# Patient Record
Sex: MALE | Race: BLACK OR AFRICAN AMERICAN | Employment: FULL TIME | URBAN - METROPOLITAN AREA
[De-identification: names, ages, dates, MRNs, and addresses within clinical notes are randomized per-mention and may not be internally consistent; named-entity substitution may affect disease eponyms.]

---

## 2021-01-11 PROCEDURE — 99284 EMERGENCY DEPT VISIT MOD MDM: CPT

## 2021-01-11 PROCEDURE — 26725 TREAT FINGER FRACTURE EACH: CPT

## 2021-01-12 ENCOUNTER — APPOINTMENT (OUTPATIENT)
Dept: GENERAL RADIOLOGY | Age: 36
End: 2021-01-12
Payer: COMMERCIAL

## 2021-01-12 ENCOUNTER — HOSPITAL ENCOUNTER (EMERGENCY)
Age: 36
Discharge: HOME | End: 2021-01-12
Attending: EMERGENCY MEDICINE
Payer: COMMERCIAL

## 2021-01-12 VITALS
OXYGEN SATURATION: 98 % | DIASTOLIC BLOOD PRESSURE: 100 MMHG | HEART RATE: 92 BPM | TEMPERATURE: 98 F | RESPIRATION RATE: 18 BRPM | SYSTOLIC BLOOD PRESSURE: 162 MMHG

## 2021-01-12 DIAGNOSIS — S62.616A DISPLACED FRACTURE OF PROXIMAL PHALANX OF RIGHT LITTLE FINGER, INITIAL ENCOUNTER FOR CLOSED FRACTURE: Primary | ICD-10-CM

## 2021-01-12 PROCEDURE — 6370000000 HC RX 637 (ALT 250 FOR IP): Performed by: STUDENT IN AN ORGANIZED HEALTH CARE EDUCATION/TRAINING PROGRAM

## 2021-01-12 PROCEDURE — 73130 X-RAY EXAM OF HAND: CPT

## 2021-01-12 PROCEDURE — 6360000002 HC RX W HCPCS: Performed by: STUDENT IN AN ORGANIZED HEALTH CARE EDUCATION/TRAINING PROGRAM

## 2021-01-12 PROCEDURE — 90715 TDAP VACCINE 7 YRS/> IM: CPT | Performed by: STUDENT IN AN ORGANIZED HEALTH CARE EDUCATION/TRAINING PROGRAM

## 2021-01-12 PROCEDURE — 90471 IMMUNIZATION ADMIN: CPT | Performed by: STUDENT IN AN ORGANIZED HEALTH CARE EDUCATION/TRAINING PROGRAM

## 2021-01-12 PROCEDURE — 73120 X-RAY EXAM OF HAND: CPT

## 2021-01-12 RX ORDER — ACETAMINOPHEN 325 MG/1
650 TABLET ORAL ONCE
Status: COMPLETED | OUTPATIENT
Start: 2021-01-12 | End: 2021-01-12

## 2021-01-12 RX ORDER — IBUPROFEN 400 MG/1
400 TABLET ORAL ONCE
Status: COMPLETED | OUTPATIENT
Start: 2021-01-12 | End: 2021-01-12

## 2021-01-12 RX ORDER — ACETAMINOPHEN 325 MG/1
325 TABLET ORAL EVERY 6 HOURS PRN
Qty: 60 TABLET | Refills: 0 | Status: SHIPPED | OUTPATIENT
Start: 2021-01-12 | End: 2021-03-02 | Stop reason: ALTCHOICE

## 2021-01-12 RX ORDER — IBUPROFEN 400 MG/1
400 TABLET ORAL EVERY 6 HOURS PRN
Qty: 60 TABLET | Refills: 0 | Status: SHIPPED | OUTPATIENT
Start: 2021-01-12 | End: 2021-03-02 | Stop reason: ALTCHOICE

## 2021-01-12 RX ADMIN — IBUPROFEN 400 MG: 400 TABLET, FILM COATED ORAL at 01:41

## 2021-01-12 RX ADMIN — ACETAMINOPHEN 650 MG: 325 TABLET ORAL at 01:41

## 2021-01-12 RX ADMIN — TETANUS TOXOID, REDUCED DIPHTHERIA TOXOID AND ACELLULAR PERTUSSIS VACCINE, ADSORBED 0.5 ML: 5; 2.5; 8; 8; 2.5 SUSPENSION INTRAMUSCULAR at 01:41

## 2021-01-12 ASSESSMENT — PAIN DESCRIPTION - PROGRESSION: CLINICAL_PROGRESSION: GRADUALLY WORSENING

## 2021-01-12 ASSESSMENT — ENCOUNTER SYMPTOMS
NAUSEA: 0
RHINORRHEA: 0
ABDOMINAL PAIN: 0
VOMITING: 0
SHORTNESS OF BREATH: 0
SORE THROAT: 0

## 2021-01-12 ASSESSMENT — PAIN DESCRIPTION - ONSET: ONSET: SUDDEN

## 2021-01-12 ASSESSMENT — PAIN DESCRIPTION - FREQUENCY: FREQUENCY: CONTINUOUS

## 2021-01-12 ASSESSMENT — PAIN SCALES - GENERAL: PAINLEVEL_OUTOF10: 7

## 2021-01-12 NOTE — ED TRIAGE NOTES
Pt to ED with c/o rt sided pinky finger pain. Pt states on Friday he was working on something and his hand slid off and hit his pinky. Pt states he thought it was dislocated and he attempted to put it back in place but the pain wont go away. Pt has no other complaints at this time.

## 2021-01-12 NOTE — ED PROVIDER NOTES
[facility]  ED  Emergency Department Encounter  EmergencyMedicine Resident     Pt Name:Ramirez Cabrera  MRN: [id number]  [place] 1985  Date of evaluation: 1/12/21  PCP:  No primary care provider on file. CHIEF COMPLAINT       Chief Complaint   Patient presents with    Finger Pain       HISTORY OF PRESENT ILLNESS  (Location/Symptom, Timing/Onset, Context/Setting, Quality, Duration, Modifying Factors, Severity.)      Jackie Anthony is a 37 y.o. male who presents with right pinky finger pain. Patient presents 4 days after accidentally hitting his fifth proximal phalanx with a hammer. Patient initially reports medial deformity, straightened it himself, splinted it, has been taking Tylenol for pain. Patient reports he is finally able to come to the emergency department for evaluation. Patient denies any other injuries, no other complaints. Patient does note that there is a slight abrasion on the dorsal aspect of the right fifth digit. Patient does not know when his last tetanus shot was. Patient denies past medical history, denies taking any medications. PAST MEDICAL / SURGICAL / SOCIAL / FAMILY HISTORY      has no past medical history on file. Fifth metacarpal fracture     has no past surgical history on file.       Social History     Socioeconomic History    Marital status:      Spouse name: Not on file    Number of children: Not on file    Years of education: Not on file    Highest education level: Not on file   Occupational History    Not on file   Social Needs    Financial resource strain: Not on file    Food insecurity     Worry: Not on file     Inability: Not on file    Transportation needs     Medical: Not on file     Non-medical: Not on file   Tobacco Use    Smoking status: Never Smoker   Substance and Sexual Activity    Alcohol use: Not Currently    Drug use: Never    Sexual activity: Yes     Partners: Female   Lifestyle    Physical activity He is well-developed. He is not ill-appearing, toxic-appearing or diaphoretic. HENT:      Head: Normocephalic and atraumatic. Neck:      Musculoskeletal: Normal range of motion and neck supple. Vascular: No JVD. Trachea: No tracheal deviation. Cardiovascular:      Rate and Rhythm: Normal rate and regular rhythm. Pulses: Normal pulses. Heart sounds: Normal heart sounds. No murmur. No friction rub. No gallop. Pulmonary:      Effort: Pulmonary effort is normal. No respiratory distress. Breath sounds: Normal breath sounds. No stridor. No wheezing, rhonchi or rales. Chest:      Chest wall: No tenderness. Musculoskeletal: Normal range of motion. General: Swelling and tenderness present. No deformity. Comments: Of the right pinky finger, patient has swelling of the proximal aspect of the phalanx with dorsal abrasion, sensation to light touch in all aspects, capillary refill less than 2 seconds, tenderness over the proximal aspect of the phalanx, no other tenderness of any other digit or of the hand, no other injuries noted. Skin:     General: Skin is warm. Capillary Refill: Capillary refill takes less than 2 seconds. Neurological:      Mental Status: He is alert and oriented to person, place, and time. Cranial Nerves: No cranial nerve deficit. Sensory: No sensory deficit.    Psychiatric:         Mood and Affect: Mood normal.         Behavior: Behavior normal.         DIFFERENTIAL  DIAGNOSIS     PLAN (LABS / IMAGING / EKG):  Orders Placed This Encounter   Procedures    XR HAND RIGHT (MIN 3 VIEWS)    XR HAND RIGHT (MIN 3 VIEWS)    XR HAND RIGHT (2 VIEWS)       MEDICATIONS ORDERED:  Orders Placed This Encounter   Medications    Tetanus-Diphth-Acell Pertussis (BOOSTRIX) injection 0.5 mL    ibuprofen (ADVIL;MOTRIN) tablet 400 mg    acetaminophen (TYLENOL) tablet 650 mg    acetaminophen (TYLENOL) 325 MG tablet     Sig: Take 1 tablet by mouth every 6 hours as needed for Pain     Dispense:  60 tablet     Refill:  0    ibuprofen (IBU) 400 MG tablet     Sig: Take 1 tablet by mouth every 6 hours as needed for Pain     Dispense:  60 tablet     Refill:  0       DDX: Fracture, dislocation, soft tissue injury    DIAGNOSTIC RESULTS / EMERGENCY DEPARTMENT COURSE / MDM   LAB RESULTS:  No results found for this visit on 01/12/21. IMPRESSION: 79-year-old male with initial angulation to a proximal fifth phalanx injury, presenting 4 days after the injury, concern for fracture versus dislocation. Will obtain x-rays, slight abrasion over dorsal aspect of phalanx, will administer Tdap, will treat symptoms with Tylenol and ibuprofen. RADIOLOGY:  Xr Hand Right (2 Views)    Result Date: 1/12/2021  EXAMINATION: TWO XRAY VIEWS OF THE RIGHT HAND 1/12/2021 2:39 am COMPARISON: None. HISTORY: ORDERING SYSTEM PROVIDED HISTORY: post reduction TECHNOLOGIST PROVIDED HISTORY: post reduction Reason for Exam: post reduction #2 FINDINGS: Anatomic approximation of fracture fragments of the 5th digit proximal phalanx distal diaphyseal fracture noted with splint in place. Bone alignment is within normal limits. Joint spaces are preserved. Bone mineralization is unremarkable. Diffuse 5th digit soft tissue swelling is noted. Anatomic approximation of fracture fragments associated with 5th digit proximal phalanx distal diaphyseal transverse acute fracture. Xr Hand Right (min 3 Views)    Result Date: 1/12/2021  EXAMINATION: THREE XRAY VIEWS OF THE RIGHT HAND 1/12/2021 2:00 am COMPARISON: None. HISTORY: ORDERING SYSTEM PROVIDED HISTORY: post reduction TECHNOLOGIST PROVIDED HISTORY: post reduction Reason for Exam: Post reduction 5th digit FINDINGS: Interval splinting of the 5th digit is noted with approximation of fracture fragments of the 5th digit proximal phalanx distal diaphyseal transverse acute fracture. No further evidence of acute fracture or evidence of joint dislocation is seen. Bone mineralization is within normal limits. Bone alignment is otherwise unremarkable. Diffuse 5th digit soft tissue swelling is evident. Interval splinting and reduction of 5th digit proximal phalanx distal diaphyseal transverse acute fracture, as discussed above. Xr Hand Right (min 3 Views)    Result Date: 1/12/2021  EXAMINATION: THREE XRAY VIEWS OF THE RIGHT HAND 1/12/2021 1:13 am COMPARISON: None. HISTORY: ORDERING SYSTEM PROVIDED HISTORY: 5th proximal phalanx injury TECHNOLOGIST PROVIDED HISTORY: 5th proximal phalanx injury Reason for Exam: Smashed hand with hammer FINDINGS: Soft tissue swelling identified along 5th digit and 5th metacarpal.  There is a fracture involving the proximal phalanx of the 5th digit with 45 degrees anterior angulation. No additional fracture identified. Joint spaces are preserved. Proximal 5th digit phalangeal fracture. EKG      All EKG's are interpreted by the Emergency Department Physician who either signs or Co-signs this chart in the absence of a cardiologist.    EMERGENCY DEPARTMENT COURSE:  Patient came to emergency department, HPI and physical exam were conducted. All nursing notes were reviewed. Administered digital nerve block, reduced angulated fracture with good approximation however still angulated, did second reduction with good approximation of the fractured fragments. Patient remained stable in the emergency department, slightly hypertensive, otherwise stable vital signs. Gave strict return precautions to the emergency department and discharge patient home. Recommended patient follow-up with plastic surgery in the next 2 days for reassessment.       PROCEDURES:  PROCEDURE NOTE - DIGITAL NERVE BLOCK    PATIENT NAME: [facility] RECORD NO. [id number]  DATE: 1/12/2021  ATTENDING PHYSICIAN: Dr. Brent Calle DIAGNOSIS:  Digit Pain  POSTOPERATIVE DIAGNOSIS:  Same  PROCEDURE PERFORMED:  Location: right short (pinkie) finger nerve block   PERFORMING PHYSICIAN: Cam Vyas MD      DISCUSSION:  J Carlos Warner is a 37y.o.-year-old male who requires a Location: right short (pinkie) finger digital nerve block for pain relief. The history and physical examination were reviewed and confirmed. CONSENT: The patient provided verbal consent for this procedure. PROCEDURE:The patient was placed with the dorsum of the hand up. 2.5 cc of 1% Lidocaine without epinephrine was injected in the web space on each side of the Location: right short (pinkie) finger digit. The patient tolerated the procedure well. Kelly George MD  6:15 AM, 1/12/21      PROCEDURE NOTE - FRACTURE REDUCTION    PATIENT NAME: [facility] RECORD NO. [id number]  DATE: 1/12/2021  ATTENDING PHYSICIAN: Dr. Canseco DIAGNOSIS:  right short (pinkie) finger proximal phalanx angulated fracture  POSTOPERATIVE DIAGNOSIS:  Same  PROCEDURE PERFORMED:  Reduction of right small finger proximal phalanx fracture reduction  PERFORMING PHYSICIAN: Cam Vyas MD    DISCUSSION:  J Carlos Warner is a 37y.o.-year-old male who requires reduction of the right small finger proximal phalanx fracture. The history and physical examination were reviewed and confirmed. CONSENT: The patient provided verbal consent for this procedure. PROCEDURE:  The pre-reduction exam showed distal perfusion & neurologic function to be normal. The patient was placed in the appropriate position. Anesthesia/pain control was obtained using a digital block of the right short (pinkie) finger using 1% Lidocaine without epinephrine. Reduction was performed. Post reduction films were obtained and revealed partial but satisfactory reduction. A post-reduction exam revealed distal perfusion & neurologic function to be normal. The affected area was immobilized with an aluminum/ foam splint. The patient tolerated the procedure well.     COMPLICATIONS: None     Evelin Lewis MD  6:15 AM, 1/12/21        CONSULTS:  None    CRITICAL CARE:  Please see attending note    FINAL IMPRESSION      1.  Displaced fracture of proximal phalanx of right little finger, initial encounter for closed fracture          DISPOSITION / PLAN     DISPOSITION Decision To Discharge 01/12/2021 02:35:59 AM      PATIENT REFERRED TO:  [facility] ED  [address]  [phone]  Go to   As needed, If symptoms worsen    A Cody Lugo MD  [address], [address]  [phone]    Schedule an appointment as soon as possible for a visit in 2 days  For reassessment      DISCHARGE MEDICATIONS:  Discharge Medication List as of 1/12/2021  2:36 AM      START taking these medications    Details   acetaminophen (TYLENOL) 325 MG tablet Take 1 tablet by mouth every 6 hours as needed for Pain, Disp-60 tablet, R-0Print      ibuprofen (IBU) 400 MG tablet Take 1 tablet by mouth every 6 hours as needed for Pain, Disp-60 tablet, R-0Print             Evelin Lewis MD  Emergency Medicine Resident    (Please note that portions of thisnote were completed with a voice recognition program.  Efforts were made to edit the dictations but occasionally words are mis-transcribed.)        Evelin Lewis MD  Resident  01/12/21 6800

## 2021-01-12 NOTE — ED PROVIDER NOTES
[facility] ED  Emergency Department  Faculty Attestation       I performed a history and physical examination of the patient and discussed management with the resident. I reviewed the residents note and agree with the documented findings including all diagnostic interpretations and plan of care. Any areas of disagreement are noted on the chart. I was personally present for the key portions of any procedures. I have documented in the chart those procedures where I was not present during the key portions. I have reviewed the emergency nurses triage note. I agree with the chief complaint, past medical history, past surgical history, allergies, medications, social and family history as documented unless otherwise noted below. Documentation of the HPI, Physical Exam and Medical Decision Making performed by sameeribcarmelo is based on my personal performance of the HPI, PE and MDM. For Physician Assistant/ Nurse Practitioner cases/documentation I have personally evaluated this patient and have completed at least one if not all key elements of the E/M (history, physical exam, and MDM). Additional findings are as noted. Pertinent Comments     Primary Care Physician: No primary care provider on file. ED Triage Vitals [01/12/21 0001]   BP Temp Temp Source Pulse Resp SpO2 Height Weight   (!) 162/100 98 °F (36.7 °C) Tympanic 92 18 98 % -- --        History/Physical: This is a 37 y.o. male who presents to the Emergency Department with complaint of right pinky finger injury on Friday (today is Monday). He went to hammer something and missed and hit is hand on something. He states it was angulated and he reduced it himself and had splinted it himself and now wanted to come in and get evaluated. On exam patient has tenderness over the right proximal phalanx with an abrasion and swelling over the area. Has limited ROM of PIP jt. Normal ROM of MCP and DID. Cap refill intact. Sensation intact. MDM/Plan: Right finger injruy  Proximal phalanx fracture with angulation. Digital nerve block   Reduction and splinting perfomred, suboptimal position. Repeated reduction attempted  F/u hand surgery   Return if any concerns arise.        CRITICAL CARE: None     Haim Boucher MD  Attending Emergency Physician        Haim Boucher MD  01/12/21 [id number]

## 2021-01-19 ENCOUNTER — OFFICE VISIT (OUTPATIENT)
Dept: BURN CARE | Age: 36
End: 2021-01-19
Payer: COMMERCIAL

## 2021-01-19 ENCOUNTER — TELEPHONE (OUTPATIENT)
Dept: BURN CARE | Age: 36
End: 2021-01-19

## 2021-01-19 VITALS
WEIGHT: 285 LBS | HEIGHT: 72 IN | DIASTOLIC BLOOD PRESSURE: 99 MMHG | SYSTOLIC BLOOD PRESSURE: 151 MMHG | TEMPERATURE: 97.3 F | HEART RATE: 79 BPM | BODY MASS INDEX: 38.6 KG/M2

## 2021-01-19 DIAGNOSIS — S62.616A CLOSED DISPLACED FRACTURE OF PROXIMAL PHALANX OF RIGHT LITTLE FINGER, INITIAL ENCOUNTER: ICD-10-CM

## 2021-01-19 DIAGNOSIS — Z00.00 ROUTINE PHYSICAL EXAMINATION: Primary | ICD-10-CM

## 2021-01-19 PROCEDURE — 99203 OFFICE O/P NEW LOW 30 MIN: CPT | Performed by: PLASTIC SURGERY

## 2021-01-19 PROCEDURE — 99202 OFFICE O/P NEW SF 15 MIN: CPT

## 2021-01-19 RX ORDER — CYCLOBENZAPRINE HCL 10 MG
TABLET ORAL
COMMUNITY
Start: 2020-12-29 | End: 2021-03-02 | Stop reason: ALTCHOICE

## 2021-01-19 NOTE — PROGRESS NOTES
Relationship status: Not on file    Intimate partner violence     Fear of current or ex partner: Not on file     Emotionally abused: Not on file     Physically abused: Not on file     Forced sexual activity: Not on file   Other Topics Concern    Not on file   Social History Narrative    Not on file     History reviewed. No pertinent family history. Review of systems is otherwise negative. BP (!) 151/99   Pulse 79   Temp 97.3 °F (36.3 °C) (Oral)   Ht 6' (1.829 m)   Wt 285 lb (129.3 kg)   BMI 38.65 kg/m²       Objective:   Physical Exam  Vitals signs and nursing note reviewed. Constitutional:       General: He is not in acute distress. Appearance: He is well-developed. He is not diaphoretic. HENT:      Head: Normocephalic and atraumatic. Nose: Nose normal.      Mouth/Throat:      Mouth: Mucous membranes are moist.      Pharynx: Oropharynx is clear. Eyes:      General:         Right eye: No discharge. Left eye: No discharge. Conjunctiva/sclera: Conjunctivae normal.      Pupils: Pupils are equal, round, and reactive to light. Neck:      Musculoskeletal: Normal range of motion. Thyroid: No thyromegaly. Vascular: No JVD. Trachea: No tracheal deviation. Cardiovascular:      Rate and Rhythm: Normal rate. Pulmonary:      Effort: Pulmonary effort is normal. No respiratory distress. Breath sounds: No wheezing. Abdominal:      General: There is no distension. Palpations: Abdomen is soft. Musculoskeletal:         General: Swelling, tenderness, deformity and signs of injury present. Comments: Displaced fracture right little finger proximal phalanx distal aspect   Skin:     General: Skin is warm and dry. Neurological:      Mental Status: He is alert and oriented to person, place, and time. Cranial Nerves: No cranial nerve deficit. Psychiatric:         Behavior: Behavior normal.         Thought Content:  Thought content normal. Judgment: Judgment normal.         Assessment:      Displaced right little finger fracture, proximal phalanx distal aspect. Plan:      CRPP versus ORIF right little finger proximal phalanx fracture    The risk of infection, bleeding, scar formation, stiffness, decreased range of motion, mal-union, non-union, need for therapy, and need for re-operation was discusses with the patient/family at great length. The above is understood and they wish to proceed. The patient was evaluated and examined with my nurse in the room at all times. Portions of this note were transcribed using Dragon voice recognition technology and as such may reflect some variations in voice recognition. COVID-19 precautions were taken throughout the entire office visit. Patient was screened for COVID-19 symptoms and temperature was taken prior to coming back to the exam room. A mask as well as gloves was worn throughout the entire office visit and distancing maintained as much as possible in between the physical examination periods.     Abner Lopez MD

## 2021-01-19 NOTE — PATIENT INSTRUCTIONS
Patient encouraged to leave splint on-will need surgery to right hand-patient is non smoker -patient informed of importance of keeping his covid appointment -not workers comp

## 2021-01-19 NOTE — LETTER
[facility], AN AFFILIATE OF [facility]  [address] [place] [id number]  Phone: [phone]  Fax: [phone]    Shelley Silveira MD        January 19, 2021    Sanjay Valencia  [address] [facility] [address]      Dear Matthew Echevarria: We have signed you up for DXY. To access DXY, you can download an amanda in your [employer]ore on an android or apple phone. Search for my chart, it will be a red folder with a heart. It will walk you through on how to finish registration. You can also go to the web site: [email or web address]. Enclosed is your username and password card. DXY is used for patients to view test results, send e-mails to provider, request refills on medications, E-Visits,and a complete summary of your health. If you have any questions or concerns, please don't hesitate to call.     Sincerely,        Shelley Silveira MD

## 2021-01-21 NOTE — PROGRESS NOTES
Writer called to update medical history. No answer. Writer left message that we will attempt contact again on Friday Jan 22.

## 2021-01-22 ENCOUNTER — HOSPITAL ENCOUNTER (OUTPATIENT)
Dept: LAB | Age: 36
Setting detail: SPECIMEN
Discharge: HOME | End: 2021-01-22
Payer: COMMERCIAL

## 2021-01-22 DIAGNOSIS — Z20.822 COVID-19 RULED OUT BY LABORATORY TESTING: Primary | ICD-10-CM

## 2021-01-22 PROCEDURE — U0003 INFECTIOUS AGENT DETECTION BY NUCLEIC ACID (DNA OR RNA); SEVERE ACUTE RESPIRATORY SYNDROME CORONAVIRUS 2 (SARS-COV-2) (CORONAVIRUS DISEASE [COVID-19]), AMPLIFIED PROBE TECHNIQUE, MAKING USE OF HIGH THROUGHPUT TECHNOLOGIES AS DESCRIBED BY CMS-2020-01-R: HCPCS

## 2021-01-22 PROCEDURE — U0005 INFEC AGEN DETEC AMPLI PROBE: HCPCS

## 2021-01-23 LAB
SARS-COV-2, RAPID: NORMAL
SARS-COV-2: NORMAL
SARS-COV-2: NOT DETECTED
SOURCE: NORMAL

## 2021-01-26 ENCOUNTER — ANESTHESIA (OUTPATIENT)
Dept: OPERATING ROOM | Age: 36
End: 2021-01-26
Payer: COMMERCIAL

## 2021-01-26 ENCOUNTER — ANESTHESIA EVENT (OUTPATIENT)
Dept: OPERATING ROOM | Age: 36
End: 2021-01-26
Payer: COMMERCIAL

## 2021-01-26 ENCOUNTER — HOSPITAL ENCOUNTER (OUTPATIENT)
Dept: GENERAL RADIOLOGY | Age: 36
Discharge: HOME | End: 2021-01-28
Payer: COMMERCIAL

## 2021-01-26 ENCOUNTER — HOSPITAL ENCOUNTER (OUTPATIENT)
Age: 36
Setting detail: OUTPATIENT SURGERY
Discharge: HOME | End: 2021-01-26
Attending: PLASTIC SURGERY | Admitting: PLASTIC SURGERY
Payer: COMMERCIAL

## 2021-01-26 VITALS
WEIGHT: 285 LBS | HEIGHT: 72 IN | DIASTOLIC BLOOD PRESSURE: 91 MMHG | HEART RATE: 81 BPM | SYSTOLIC BLOOD PRESSURE: 162 MMHG | RESPIRATION RATE: 19 BRPM | TEMPERATURE: 96.8 F | BODY MASS INDEX: 38.6 KG/M2 | OXYGEN SATURATION: 94 %

## 2021-01-26 VITALS — TEMPERATURE: 95 F | OXYGEN SATURATION: 99 % | DIASTOLIC BLOOD PRESSURE: 55 MMHG | SYSTOLIC BLOOD PRESSURE: 115 MMHG

## 2021-01-26 DIAGNOSIS — G89.18 POST-OPERATIVE PAIN: Primary | ICD-10-CM

## 2021-01-26 DIAGNOSIS — R52 PAIN: ICD-10-CM

## 2021-01-26 PROCEDURE — 3700000001 HC ADD 15 MINUTES (ANESTHESIA): Performed by: PLASTIC SURGERY

## 2021-01-26 PROCEDURE — 7100000010 HC PHASE II RECOVERY - FIRST 15 MIN: Performed by: PLASTIC SURGERY

## 2021-01-26 PROCEDURE — 6360000002 HC RX W HCPCS: Performed by: NURSE ANESTHETIST, CERTIFIED REGISTERED

## 2021-01-26 PROCEDURE — 7100000000 HC PACU RECOVERY - FIRST 15 MIN: Performed by: PLASTIC SURGERY

## 2021-01-26 PROCEDURE — 6370000000 HC RX 637 (ALT 250 FOR IP): Performed by: ANESTHESIOLOGY

## 2021-01-26 PROCEDURE — 2500000003 HC RX 250 WO HCPCS: Performed by: NURSE ANESTHETIST, CERTIFIED REGISTERED

## 2021-01-26 PROCEDURE — 3600000004 HC SURGERY LEVEL 4 BASE: Performed by: PLASTIC SURGERY

## 2021-01-26 PROCEDURE — 3209999900 FLUORO FOR SURGICAL PROCEDURES

## 2021-01-26 PROCEDURE — 2580000003 HC RX 258: Performed by: NURSE ANESTHETIST, CERTIFIED REGISTERED

## 2021-01-26 PROCEDURE — 7100000011 HC PHASE II RECOVERY - ADDTL 15 MIN: Performed by: PLASTIC SURGERY

## 2021-01-26 PROCEDURE — 2580000003 HC RX 258: Performed by: PLASTIC SURGERY

## 2021-01-26 PROCEDURE — 6360000002 HC RX W HCPCS: Performed by: STUDENT IN AN ORGANIZED HEALTH CARE EDUCATION/TRAINING PROGRAM

## 2021-01-26 PROCEDURE — L3933 FO W/O JOINTS CF: HCPCS | Performed by: PLASTIC SURGERY

## 2021-01-26 PROCEDURE — 3600000014 HC SURGERY LEVEL 4 ADDTL 15MIN: Performed by: PLASTIC SURGERY

## 2021-01-26 PROCEDURE — 6360000002 HC RX W HCPCS: Performed by: ANESTHESIOLOGY

## 2021-01-26 PROCEDURE — 2709999900 HC NON-CHARGEABLE SUPPLY: Performed by: PLASTIC SURGERY

## 2021-01-26 PROCEDURE — 3700000000 HC ANESTHESIA ATTENDED CARE: Performed by: PLASTIC SURGERY

## 2021-01-26 PROCEDURE — 7100000001 HC PACU RECOVERY - ADDTL 15 MIN: Performed by: PLASTIC SURGERY

## 2021-01-26 RX ORDER — KETOROLAC TROMETHAMINE 30 MG/ML
INJECTION, SOLUTION INTRAMUSCULAR; INTRAVENOUS PRN
Status: DISCONTINUED | OUTPATIENT
Start: 2021-01-26 | End: 2021-01-26 | Stop reason: SDUPTHER

## 2021-01-26 RX ORDER — OXYCODONE HYDROCHLORIDE AND ACETAMINOPHEN 5; 325 MG/1; MG/1
1 TABLET ORAL EVERY 6 HOURS PRN
Qty: 28 TABLET | Refills: 0 | Status: SHIPPED | OUTPATIENT
Start: 2021-01-26 | End: 2021-02-02

## 2021-01-26 RX ORDER — ONDANSETRON 2 MG/ML
INJECTION INTRAMUSCULAR; INTRAVENOUS PRN
Status: DISCONTINUED | OUTPATIENT
Start: 2021-01-26 | End: 2021-01-26 | Stop reason: SDUPTHER

## 2021-01-26 RX ORDER — FENTANYL CITRATE 50 UG/ML
25 INJECTION, SOLUTION INTRAMUSCULAR; INTRAVENOUS EVERY 5 MIN PRN
Status: DISCONTINUED | OUTPATIENT
Start: 2021-01-26 | End: 2021-01-26 | Stop reason: HOSPADM

## 2021-01-26 RX ORDER — FENTANYL CITRATE 50 UG/ML
50 INJECTION, SOLUTION INTRAMUSCULAR; INTRAVENOUS EVERY 5 MIN PRN
Status: DISCONTINUED | OUTPATIENT
Start: 2021-01-26 | End: 2021-01-26 | Stop reason: HOSPADM

## 2021-01-26 RX ORDER — SODIUM CHLORIDE, SODIUM LACTATE, POTASSIUM CHLORIDE, CALCIUM CHLORIDE 600; 310; 30; 20 MG/100ML; MG/100ML; MG/100ML; MG/100ML
INJECTION, SOLUTION INTRAVENOUS CONTINUOUS PRN
Status: DISCONTINUED | OUTPATIENT
Start: 2021-01-26 | End: 2021-01-26 | Stop reason: SDUPTHER

## 2021-01-26 RX ORDER — ROCURONIUM BROMIDE 10 MG/ML
INJECTION, SOLUTION INTRAVENOUS PRN
Status: DISCONTINUED | OUTPATIENT
Start: 2021-01-26 | End: 2021-01-26 | Stop reason: SDUPTHER

## 2021-01-26 RX ORDER — FENTANYL CITRATE 50 UG/ML
INJECTION, SOLUTION INTRAMUSCULAR; INTRAVENOUS PRN
Status: DISCONTINUED | OUTPATIENT
Start: 2021-01-26 | End: 2021-01-26 | Stop reason: SDUPTHER

## 2021-01-26 RX ORDER — GLYCOPYRROLATE 1 MG/5 ML
SYRINGE (ML) INTRAVENOUS PRN
Status: DISCONTINUED | OUTPATIENT
Start: 2021-01-26 | End: 2021-01-26 | Stop reason: SDUPTHER

## 2021-01-26 RX ORDER — DEXAMETHASONE SODIUM PHOSPHATE 4 MG/ML
INJECTION, SOLUTION INTRA-ARTICULAR; INTRALESIONAL; INTRAMUSCULAR; INTRAVENOUS; SOFT TISSUE PRN
Status: DISCONTINUED | OUTPATIENT
Start: 2021-01-26 | End: 2021-01-26 | Stop reason: SDUPTHER

## 2021-01-26 RX ORDER — MAGNESIUM HYDROXIDE 1200 MG/15ML
LIQUID ORAL CONTINUOUS PRN
Status: COMPLETED | OUTPATIENT
Start: 2021-01-26 | End: 2021-01-26

## 2021-01-26 RX ORDER — PROPOFOL 10 MG/ML
INJECTION, EMULSION INTRAVENOUS PRN
Status: DISCONTINUED | OUTPATIENT
Start: 2021-01-26 | End: 2021-01-26 | Stop reason: SDUPTHER

## 2021-01-26 RX ORDER — OXYCODONE HYDROCHLORIDE AND ACETAMINOPHEN 5; 325 MG/1; MG/1
1 TABLET ORAL
Status: COMPLETED | OUTPATIENT
Start: 2021-01-26 | End: 2021-01-26

## 2021-01-26 RX ORDER — CEPHALEXIN 500 MG/1
500 CAPSULE ORAL 3 TIMES DAILY
Qty: 21 CAPSULE | Refills: 0 | Status: SHIPPED | OUTPATIENT
Start: 2021-01-26 | End: 2021-02-02

## 2021-01-26 RX ORDER — NEOSTIGMINE METHYLSULFATE 5 MG/5 ML
SYRINGE (ML) INTRAVENOUS PRN
Status: DISCONTINUED | OUTPATIENT
Start: 2021-01-26 | End: 2021-01-26 | Stop reason: SDUPTHER

## 2021-01-26 RX ORDER — LIDOCAINE HYDROCHLORIDE 10 MG/ML
INJECTION, SOLUTION EPIDURAL; INFILTRATION; INTRACAUDAL; PERINEURAL PRN
Status: DISCONTINUED | OUTPATIENT
Start: 2021-01-26 | End: 2021-01-26 | Stop reason: SDUPTHER

## 2021-01-26 RX ADMIN — Medication 3 G: at 13:57

## 2021-01-26 RX ADMIN — FENTANYL CITRATE 25 MCG: 50 INJECTION, SOLUTION INTRAMUSCULAR; INTRAVENOUS at 15:26

## 2021-01-26 RX ADMIN — LIDOCAINE HYDROCHLORIDE 50 MG: 10 INJECTION, SOLUTION EPIDURAL; INFILTRATION; INTRACAUDAL; PERINEURAL at 13:48

## 2021-01-26 RX ADMIN — FENTANYL CITRATE 100 MCG: 50 INJECTION, SOLUTION INTRAMUSCULAR; INTRAVENOUS at 13:48

## 2021-01-26 RX ADMIN — KETOROLAC TROMETHAMINE 30 MG: 30 INJECTION, SOLUTION INTRAMUSCULAR at 14:40

## 2021-01-26 RX ADMIN — PROPOFOL 250 MG: 10 INJECTION, EMULSION INTRAVENOUS at 13:48

## 2021-01-26 RX ADMIN — Medication 0.4 MG: at 14:40

## 2021-01-26 RX ADMIN — SODIUM CHLORIDE, POTASSIUM CHLORIDE, SODIUM LACTATE AND CALCIUM CHLORIDE: 600; 310; 30; 20 INJECTION, SOLUTION INTRAVENOUS at 13:42

## 2021-01-26 RX ADMIN — Medication 3 MG: at 14:40

## 2021-01-26 RX ADMIN — OXYCODONE HYDROCHLORIDE AND ACETAMINOPHEN 1 TABLET: 5; 325 TABLET ORAL at 15:58

## 2021-01-26 RX ADMIN — DEXAMETHASONE SODIUM PHOSPHATE 4 MG: 4 INJECTION, SOLUTION INTRAMUSCULAR; INTRAVENOUS at 13:48

## 2021-01-26 RX ADMIN — SODIUM CHLORIDE, POTASSIUM CHLORIDE, SODIUM LACTATE AND CALCIUM CHLORIDE: 600; 310; 30; 20 INJECTION, SOLUTION INTRAVENOUS at 14:29

## 2021-01-26 RX ADMIN — ROCURONIUM BROMIDE 40 MG: 10 INJECTION INTRAVENOUS at 13:58

## 2021-01-26 RX ADMIN — ONDANSETRON 4 MG: 2 INJECTION INTRAMUSCULAR; INTRAVENOUS at 14:40

## 2021-01-26 ASSESSMENT — PULMONARY FUNCTION TESTS
PIF_VALUE: 8
PIF_VALUE: 4
PIF_VALUE: 3
PIF_VALUE: 19
PIF_VALUE: 22
PIF_VALUE: 32
PIF_VALUE: 0
PIF_VALUE: 25
PIF_VALUE: 25
PIF_VALUE: 37
PIF_VALUE: 25
PIF_VALUE: 21
PIF_VALUE: 25
PIF_VALUE: 35
PIF_VALUE: 25
PIF_VALUE: 25
PIF_VALUE: 1
PIF_VALUE: 4
PIF_VALUE: 19
PIF_VALUE: 25
PIF_VALUE: 27
PIF_VALUE: 25
PIF_VALUE: 27
PIF_VALUE: 25
PIF_VALUE: 25
PIF_VALUE: 33
PIF_VALUE: 25

## 2021-01-26 ASSESSMENT — PAIN SCALES - GENERAL
PAINLEVEL_OUTOF10: 0
PAINLEVEL_OUTOF10: 4

## 2021-01-26 ASSESSMENT — PAIN DESCRIPTION - PAIN TYPE
TYPE: SURGICAL PAIN
TYPE: SURGICAL PAIN

## 2021-01-26 ASSESSMENT — ENCOUNTER SYMPTOMS: SHORTNESS OF BREATH: 0

## 2021-01-26 NOTE — OP NOTE
adaptic and gauze cling was applied followed by an Alumafoam splint and Ace wrap. The procedure was well tolerated without complications. Pt will call with any questions or problems prior to follow up.       Electronically signed by Karely Correa DO on 1/26/2021 at 4:26 PM

## 2021-01-26 NOTE — H&P
History and Physical    Pt Name: Jh Palomo  MRN: [id number]  YOB: 1985  Date of evaluation: 1/26/2021  Primary Care Physician: No primary care provider on file. SUBJECTIVE:   History of Chief Complaint:    Jh Palomo is a 37 y.o. male who is scheduled today for CLOSED REDUCTION PERC. PINNING VS ORIF LITTLE FINGER- RIGHT. He reports fracturing his right little finger while working. He works for the [employer] and was hammering when he hurt his right pinky a couple of weeks ago. He is right hand dominant. He denies pain to site at this time. Allergies  has No Known Allergies. Medications  Prior to Admission medications    Medication Sig Start Date End Date Taking? Authorizing Provider   cyclobenzaprine (FLEXERIL) 10 MG tablet take 1 tablet by mouth every 8 hours 12/29/20  Yes Historical Provider, MD   acetaminophen (TYLENOL) 325 MG tablet Take 1 tablet by mouth every 6 hours as needed for Pain 1/12/21  Yes Liyah Johnson MD   ibuprofen (IBU) 400 MG tablet Take 1 tablet by mouth every 6 hours as needed for Pain 1/12/21  Yes Liyah Johnson MD     Past Medical History    has a past medical history of Fracture of phalanx of right little finger and Wears glasses. Past Surgical History   has a past surgical history that includes fracture surgery (Right); Vasectomy; and Omega tooth extraction. Social History   reports that he has never smoked. He has never used smokeless tobacco.   reports current alcohol use. reports no history of drug use. Marital Status    Children   Occ[employer]  Family History  Family Status   Relation Name Status    Mother  [facility]  (Not Specified)   [address] [place] [address]  (Not Specified)     family history includes Asthma in his mother; Diabetes in his maternal aunt; Hypertension in his maternal grandmother. OBJECTIVE:   VITALS:  height is 6' (1.829 m) and weight is 285 lb (129.3 kg). His temporal temperature is 97.7 °F (36.5 °C).  His blood pressure is 146/102 (abnormal) and his pulse is 70. His respiration is 18 and oxygen saturation is 96%. CONSTITUTIONAL:Alert and orientated to person, place and time. No acute distress. Friendly. Very pleasant. SKIN:  Warm & dry, no rashes on exposed skin  HEENT: HEAD: Normocephalic, atraumatic        EYES:  PERRL, EOMs intact, conjunctiva clear, wearing glasses       EARS:  Equal bilaterally, no edema or thickening, skin is intact without lumps or lesions. No discharge. NOSE:  Nares patent, septum midline, no rhinorrhea      MOUTH/THROAT:  Mucous membranes moist, tongue is pink, uvula midline, teeth appear to be intact  NECK:  Supple, no lymphadenopathy, full ROM  LUNGS: Respirations even and non-labored. Clear to auscultation bilaterally, no wheezes/rales/rhonchi   CARDIOVASCULAR: regular rate and rhythm, no murmurs/rubs/gallops   ABDOMEN: soft, non-tender, non-distended, bowel sounds active x 4, rotund   MUSCULOSKELETAL: Full ROM bilateral upper extremities, right pinky in an alumafoam splint and ace wrap. Full ROM bilateral lower extremities. Strength of 5/5 bilateral upper extremities. Strength 5/5 bilateral lower extremities. VASCULAR:  Brisk cap refill bilateral fingers. Radial pulses are intact, 2+ bilaterally. Dorsalis pedis pulse 2+ bilaterally. No edema or varicosities bilateral lower extremities  NEUROLOGIC: CN II-XII are grossly intact. Gait not assessed. IMPRESSIONS:   Fracture right little finger      Diagnosis Date    Fracture of phalanx of right little finger 01/18/2021    work injury    Wears glasses      PLANS:   CLOSED REDUCTION PERC.  PINNING VS ORIF LITTLE FINGER- Right     RUSTY BENTLEY  Electronically signed 1/26/2021 at 12:38 PM

## 2021-01-26 NOTE — ANESTHESIA POSTPROCEDURE EVALUATION
Department of Anesthesiology  Postprocedure Note    Patient: Paris Samson  MRN: [id number]  YOB: 1985  Date of evaluation: 1/26/2021  Time:  5:48 PM     Procedure Summary     Date: 01/26/21 Room / Location: [address]    Anesthesia Start: 4137 Anesthesia Stop: 2622    Procedure: CLOSED REDUCTION PERC. PINNING LITTLE FINGER, C-ARM, K-WIRES (Right Hand) Diagnosis: (FRACTURE RIGHT LITTLE FINGER)    Surgeons: Stacey Rey MD Responsible Provider: Bree Johnson MD    Anesthesia Type: general ASA Status: 2          Anesthesia Type: general    Jaiden Phase I: Jaiden Score: 10    Jaiden Phase II: Jaiden Score: 10    Last vitals: Reviewed and per EMR flowsheets.        Anesthesia Post Evaluation    Patient location during evaluation: PACU  Patient participation: complete - patient participated  Level of consciousness: awake and alert  Pain score: 0  Nausea & Vomiting: no nausea  Cardiovascular status: hemodynamically stable  Respiratory status: room air  Hydration status: euvolemic 37.3

## 2021-01-26 NOTE — PROGRESS NOTES
Dc instructions to dad with 2 scripts to be filled -voices understanding/pt given instructions with some [id number]

## 2021-01-26 NOTE — ANESTHESIA PRE PROCEDURE
Department of Anesthesiology  Preprocedure Note       Name:  Jo Ann Peralta   Age:  37 y.o.  :  1985                                          MRN:  [id number]         Date:  2021      Surgeon: Villa Mcfarland): Lucy Huggins MD    Procedure: Procedure(s):  CLOSED REDUCTION PERC. PINNING VS ORIF LITTLE FINGER, C-ARM, K-WIRES, MINI     Medications prior to admission:   Prior to Admission medications    Medication Sig Start Date End Date Taking? Authorizing Provider   cyclobenzaprine (FLEXERIL) 10 MG tablet take 1 tablet by mouth every 8 hours 20  Yes Historical Provider, MD   acetaminophen (TYLENOL) 325 MG tablet Take 1 tablet by mouth every 6 hours as needed for Pain 21  Yes Verona Ramirez MD   ibuprofen (IBU) 400 MG tablet Take 1 tablet by mouth every 6 hours as needed for Pain 21  Yes Verona Ramirez MD       Current medications:    No current facility-administered medications for this encounter. Allergies:  No Known Allergies    Problem List:    Patient Active Problem List   Diagnosis Code    Closed displaced fracture of proximal phalanx of right little finger S62.616A       Past Medical History:  History reviewed. No pertinent past medical history. Past Surgical History:        Procedure Laterality Date    HAND SURGERY Right     VASECTOMY      WISDOM TOOTH EXTRACTION         Social History:    Social History     Tobacco Use    Smoking status: Never Smoker    Smokeless tobacco: Never Used   Substance Use Topics    Alcohol use: Not Currently                                Counseling given: Not Answered      Vital Signs (Current): There were no vitals filed for this visit.                                            BP Readings from Last 3 Encounters:   21 (!) 151/99   21 (!) 162/100       NPO Status: Time of last liquid consumption: 2300                        Time of last solid consumption: 2300 Date of last liquid consumption: 01/25/21                        Date of last solid food consumption: 01/25/21    BMI:   Wt Readings from Last 3 Encounters:   01/19/21 285 lb (129.3 kg)     There is no height or weight on file to calculate BMI.    CBC: No results found for: WBC, RBC, HGB, HCT, MCV, RDW, PLT    CMP: No results found for: NA, K, CL, CO2, BUN, CREATININE, GFRAA, AGRATIO, LABGLOM, GLUCOSE, PROT, CALCIUM, BILITOT, ALKPHOS, AST, ALT    POC Tests: No results for input(s): POCGLU, POCNA, POCK, POCCL, POCBUN, POCHEMO, POCHCT in the last 72 hours. Coags: No results found for: PROTIME, INR, APTT    HCG (If Applicable): No results found for: PREGTESTUR, PREGSERUM, HCG, HCGQUANT     ABGs: No results found for: PHART, PO2ART, NZP0NCZ, MUI4UZQ, BEART, Z3LAHDHU     Type & Screen (If Applicable):  No results found for: LABABO, LABRH    Drug/Infectious Status (If Applicable):  No results found for: HIV, HEPCAB    COVID-19 Screening (If Applicable):   Lab Results   Component Value Date    COVID19 Not Detected 01/23/2021         Anesthesia Evaluation  Patient summary reviewed and Nursing notes reviewed no history of anesthetic complications:   Airway: Mallampati: III  TM distance: >3 FB   Neck ROM: full  Mouth opening: > = 3 FB Dental: normal exam         Pulmonary:normal exam  breath sounds clear to auscultation      (-) COPD, asthma, shortness of breath, recent URI and sleep apnea                           Cardiovascular:Negative CV ROS  Exercise tolerance: good (>4 METS),       (-) hypertension, past MI, CAD, CABG/stent,  angina,  CHF, orthopnea and  CRUZ      Rhythm: regular  Rate: normal                    Neuro/Psych:   Negative Neuro/Psych ROS     (-) seizures, TIA and CVA           GI/Hepatic/Renal: Neg GI/Hepatic/Renal ROS       (-) GERD       Endo/Other:        (-) diabetes mellitus                ROS comment: Fx R little finger Abdominal:           Vascular: negative vascular ROS. Anesthesia Plan      general     ASA 2     (GA ET)  Induction: intravenous. MIPS: Postoperative opioids intended and Prophylactic antiemetics administered. Anesthetic plan and risks discussed with patient.       Plan discussed with CRNA and surgical team.                Elly Reagan MD   1/26/2021

## 2021-02-09 ENCOUNTER — OFFICE VISIT (OUTPATIENT)
Dept: BURN CARE | Age: 36
End: 2021-02-09
Payer: COMMERCIAL

## 2021-02-09 VITALS
HEIGHT: 72 IN | WEIGHT: 285 LBS | DIASTOLIC BLOOD PRESSURE: 87 MMHG | HEART RATE: 78 BPM | SYSTOLIC BLOOD PRESSURE: 133 MMHG | BODY MASS INDEX: 38.6 KG/M2

## 2021-02-09 DIAGNOSIS — S62.616D CLOSED DISPLACED FRACTURE OF PROXIMAL PHALANX OF RIGHT LITTLE FINGER WITH ROUTINE HEALING, SUBSEQUENT ENCOUNTER: ICD-10-CM

## 2021-02-09 PROCEDURE — 99211 OFF/OP EST MAY X REQ PHY/QHP: CPT | Performed by: PLASTIC SURGERY

## 2021-02-09 PROCEDURE — 99024 POSTOP FOLLOW-UP VISIT: CPT | Performed by: PLASTIC SURGERY

## 2021-02-09 NOTE — PROGRESS NOTES
Burn/Hand Clinic Note    S: Pt is a 37 y.o. male being seen for 2 week post op visit for CRPP right 5th proximal phalanx. Surgery was on 1/26. Patient is doing well today and has no complaints. He has maintained his splint. Reports that he has not had any pain since the swelling has gone down. Denies any fevers or chills. 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 O:   Vitals:    02/09/21 1013   BP: 133/87   Pulse: 78     Gen: NAD, cooperative    MSK: Right upper extremity: Alumafoam splint on, which is clean, dry, and intact. No surrounding erythema or edema. No evidence of infection. Hand warm and perfused. Non-splinted digits with full range of motion. A/P: 37 y.o. male being seen for post op visit of CRPP right 5th proximal phalanx fracture  -Maintain splint. Keep clean and dry.   -No lifting, pushing, or pulling of the right upper extremity.   -Ibuprofen/tylenol for pain control  -F/u 3 weeks for pin removal     Chelsy Rosa DO   10:26 AM 2/9/2021    Attending Physician Statement  I have discussed the case, including pertinent history and exam findings with the resident. I have seen and examined the patient and the key elements of all parts of the encounter have been performed by me. I agree with the assessment, plan and orders as documented by the resident.   Doe Chisholm MD

## 2021-03-02 ENCOUNTER — OFFICE VISIT (OUTPATIENT)
Dept: BURN CARE | Age: 36
End: 2021-03-02
Payer: COMMERCIAL

## 2021-03-02 VITALS
TEMPERATURE: 97.9 F | BODY MASS INDEX: 39.33 KG/M2 | SYSTOLIC BLOOD PRESSURE: 143 MMHG | HEART RATE: 90 BPM | WEIGHT: 290 LBS | DIASTOLIC BLOOD PRESSURE: 85 MMHG

## 2021-03-02 DIAGNOSIS — S62.616D CLOSED DISPLACED FRACTURE OF PROXIMAL PHALANX OF RIGHT LITTLE FINGER WITH ROUTINE HEALING, SUBSEQUENT ENCOUNTER: Primary | ICD-10-CM

## 2021-03-02 PROCEDURE — 99024 POSTOP FOLLOW-UP VISIT: CPT | Performed by: PLASTIC SURGERY

## 2021-03-02 NOTE — PROGRESS NOTES
[facility] Office Note  ARNOLDO Li MD, FACS      PATIENT NAME: Davi Valencia     Pt comes in today following close reduction percutaneous pinning of his right little finger fracture. Patient has no complaints at this time. REVIEW OF SYSTEMS:    Past Medical History:   Diagnosis Date    Fracture of phalanx of right little finger 2021    work injury    Wears glasses      Past Surgical History:   Procedure Laterality Date    FINGER SURGERY Right     CRPP    FINGER SURGERY Right 2021    CLOSED REDUCTION PERC. PINNING LITTLE FINGER, C-ARM, K-WIRES performed by Magalie Reddy MD at [address] [place] [address] Right     hand, age 34   Saul VASECTOMY      WISDOM TOOTH EXTRACTION       No Known Allergies  No current outpatient medications on file. No current facility-administered medications for this visit.       BP (!) 143/85   Pulse 90   Temp 97.9 °F (36.6 °C) (Oral)   Wt 290 lb (131.5 kg)   BMI 39.33 kg/m²   Social History     Socioeconomic History    Marital status:      Spouse name: Not on file    Number of children: Not on file    Years of education: Not on file    Highest education level: Not on file   Occupational History    Not on file   Social Needs    Financial resource strain: Not on file    Food insecurity     Worry: Not on file     Inability: Not on file    Transportation needs     Medical: Not on file     Non-medical: Not on file   Tobacco Use    Smoking status: Former Smoker     Quit date: 2004     Years since quittin.1    Smokeless tobacco: Never Used    Tobacco comment: used to smoke for couple months as a teen   Substance and Sexual Activity    Alcohol use: Yes     Comment: social    Drug use: Never    Sexual activity: Yes     Partners: Female   Lifestyle    Physical activity     Days per week: Not on file     Minutes per session: Not on file    Stress: Not on file   Relationships    Social connections Talks on phone: Not on file     Gets together: Not on file     Attends Mu-ism service: Not on file     Active member of club or organization: Not on file     Attends meetings of clubs or organizations: Not on file     Relationship status: Not on file    Intimate partner violence     Fear of current or ex partner: Not on file     Emotionally abused: Not on file     Physically abused: Not on file     Forced sexual activity: Not on file   Other Topics Concern    Not on file   Social History Narrative    Not on file       Review of systems is otherwise negative. On examination pin site is clean and intact. The skin overlying the pinswas cleansed with an alcohol wipe, incised and pushed aside exposing the underlying pin. Each Pin was firmly grasped with a pair of needle holders and rotated while pulled in a retrograde fashion until it was completely removed. 2 K wires were removed. The Patient tolerated the procedure well and a band aid was placed. Patient Active Problem List   Diagnosis    Closed displaced fracture of proximal phalanx of right little finger         Plan:  1. Start occupational therapy. 2.  Patient can return to work with no use of the right hand. 3.  Follow-up in 4 weeks. COVID-19 precautions were taken throughout the entire office visit. Patient was screened for COVID-19 symptoms and temperature was taken prior to coming back to the exam room. A mask as well as gloves was worn throughout the entire office visit and distancing maintained as much as possible in between the physical examination periods. The patient was seen, evaluated, and treated with my nurse in the room at all times. Portions of this note were transcribed using Dragon voice recognition technology and may reflect some voice recognition variability.

## 2021-03-08 ENCOUNTER — HOSPITAL ENCOUNTER (OUTPATIENT)
Dept: OCCUPATIONAL THERAPY | Age: 36
Setting detail: THERAPIES SERIES
Discharge: HOME | End: 2021-03-08
Payer: COMMERCIAL

## 2021-03-08 PROCEDURE — 97165 OT EVAL LOW COMPLEX 30 MIN: CPT

## 2021-03-08 PROCEDURE — 97140 MANUAL THERAPY 1/> REGIONS: CPT

## 2021-03-08 PROCEDURE — 97110 THERAPEUTIC EXERCISES: CPT

## 2021-03-08 NOTE — CONSULTS
[x] [address] [facility] floor       [address], [place]         Phone: [phone]       Fax: [phone] [] [facility]  [address] [place] [address]  Phone: [phone]  Fax: [phone]       Occupational Therapy Hand & Upper Extremity  Initial Evaluation    Date: 3/8/2021      Patient: Nnia Huynh  : 1985  MRN: [id number]  Referring Provider:  Simba Diallo MD  Insurance: Other BCBS 180/180 life time amount   Medical Diagnosis: displaced fracture of proximal phalanx of R little finger, subsequent encounter for fracture with routine healing S62.616D   Rehab Codes: pain in hand M79.646,, stiffness in hand M25.64,, lack of coordination R27.8,, fine motor skills loss R29.818,, muscle weakness generalized M62.81, or adherent scar L90.5,  Onset Date: 21    Next Dr. Milla Tolbert: 3-30-21    Subjective:   CC: It is tight, I cannot move the finger even with the brace off. HPI: (onset date) 21 was working on a train car and hit the  causing a fx in the small digit    Mechanism of Injury: hit hand on train part  Surgery Date: 21  Precautions: Other \"do not use R arm\"    Involved Extremity: Right   Dominant Extremity: Right    Past Medical History: Unremarkable          Comorbidities: NA    Medications: None Allergies:  None    Pain: Intensity:   6/10 Location: Small MCP     Pain Type: constant  Pain Altered Tx: no  Action Taken:none            Home Environment:    Pt lives in a  and House With 4+ and B Handrail CARY  The washing machine is on and the lower level (basement)    [employer]ocation [employer]   Job Status []Normal duty []Light duty [x] Off due to condition []Retired  []Not employed []Disability  []Other:  []  Return to work:    Work activities/duties      Avocational Activities Children     [x] [address]     [] Grandparenting     [] Care giver [] OTHER [] NA       ADL/IADL Previous level of function Current level of function Who assists or modifications made or needed   Bathing  [x] Independent    [] Assist  [] Independent    [x] Assist  Wife helps with all assist   Dress/grooming [x] Independent    [] Assist  [] Independent    [x] Assist     Transfer/mobility [x] Independent    [] Assist  [x] Independent    [] Assist     Feeding [x] Independent    [] Assist  [] Independent    [x] Assist     Toileting [x] Independent    [] Assist  [x] Independent    [] Assist     Driving [x] Independent    [] Assist  [x] Independent    [] Assist     Housekeeping [x] Independent    [] Assist  [] Independent    [x] Assist     Grocery shop/meal prep [x] Independent    [] Assist  [] Independent    [x] Assist       Device: Independent   Orthosis: Currently has  Digit extension  Objective: Tests/Measurements: Upper Extremity Functional Index  Current Functional Level:  39 functionally impaired as measured with the Upper Extremity Functional Index Survey. 0-80 scale, with 80 = no Deficits  (The UEFI model does not provide any specific cut off points that could classify the upper limb disability degree, however, a minimal detectable change of 9 points is provided. This means that for improvement or deterioration to be considered, between two subsequent evaluations, the scores must differ by at least 9 points.)    Sensibility: Normal Edema: Min      Skin Color: Normal Skin/Scar condition Dry and Scabs    Problems: Pain, ROM, Strength, Function, Edema, Adherent Scar and Coordination     STRENGTH (Measured in pounds) 3-8-21     pounds RIGHT LEFT    45 75   Lateral pinch 20 23   2 point pinch 13 14   3 jaw pinch 18 20   The affected extremity is 40% weaker than the unaffected extremity.   (affected score/unaffected score, take the total and subtract from 100)    DIGITS   Right Extension/Flexion 3-8-21  degrees LITTLE   MCP 0/18   PIP 0/18   DIP 0/10         R Small Digit  L small Digit  P1  7.5   P1 6.5  P2 6.2   P2 5.8    Assessment:  Patient would benefit from skilled occupational therapy services in order to: Improve  functional /grasp, Motor control/Tone in UE, ROM, Strength, Activity tolerance, Coordination deficit and Complaint of pain in order to Ensure safe return to work    Short Term Goals: (  12    Treatments)  1. Decrease Pain: to 4/10 with simple grooming tasks at home  2. Increase AROM (degrees) (extension/flexion)  a. R small digit MCP flexion to 30 for increased promotion for loose fist  b. R small digit PIP flexion to 30 for increased promotion for loose fist  c. R small DIP flexion to 25 for increased promotion of loose fist  3. Increase strength (pounds);  a. R  strength to 55 for increased I with home care tasks (open jars)  b. R Lateral pinch to 22 for increased I with heavy home tasks  c. R 2 point pinch to 15 for increased I with heavy home tasks  d. R 3 jaw pinch to 24 for increased I with heavy home tasks  4. Increase function:UE Functional Index Score 54 or more points to promote increased functional abilities  5. Decrease Edema: of R small digit at P1 to 7.0 and P2 to 5.8   6. Patient to be independent with home exercise program as demonstrated by performance with correct form without cues. Long Term Goals: (  24  Treatments)  1. Scar will be soft and pliable with minimal tethering. 7. 2. Increase AROM (degrees) (extension/flexion)  a. R small digit MCP flexion to 75 for increased promotion for loose fist  b. R small digit PIP flexion to 65 for increased promotion for loose fist  c. R small DIP flexion to 50 for increased promotion of loose fist  8.  Increase strength (pounds);  a. R  strength to 65 for increased I with home care tasks (open jars)  b. R Lateral pinch to 25 for increased I with heavy home tasks  c. R 2 point pinch to 17 for increased I with heavy home tasks   R 3 jaw pinch to 24 for increased I with heavy home tasks    Patient Goals: be able to open jars/doors without pain or serious effort, be able to write with ease and decreased pain    Treatment Potential: Good Suggested Professional Referral: No  Domestic Concerns: No   Barriers to Goal Achievement: No    Comments/Assessment: Pt presents s/p injury to R small digit while using a hammer to put a coupling together. Pt had CRPP on 1-26-21, pins now removed. Pt wearing a metal store bought digit extension splint this date. Noted swelling and pin site with adhered scars. Pt has minimal movement of the AROM R small digit with flexion and minimal PROM of the R small digit flexion. Issued AROM exercises for home this date. Home Program Initiated: Written, Verbal and Demo Comprehension of Education: Yes       Plans, Goals, Risks, Benefits, Discussed with and Patient    Treatment Plan:  Therapeutic Ex 58422, Therapeutic Act 51761, HEP, Cold/Hot Pack, Ultrasound [id number] and ADL 13197    Treatment Plan:  Frequency: 3 x/weeks for  24 visits     Today's Treatment:  Modalities:  Precautions: No use of R UE at work per doc   Exercise Flow Sheet:  Exercise Reps/Time Weight/Level Comments   AROM 10  Added and completed (See chart)   PROM   Added and completed                           Other: Pt reminded to use small digit with AROM exercises as demo guarding with movement. Specific Instructions for Next Treatment: continue to add in light strengthening exercises. Evaluation Complexity:  History (Personal factors, comorbidities) [x]  0 []  1-2 []  3+   Exam (limitations, restrictions) [x]  1-2 []  3 []  4+   Decision Making [x]  Low []  Moderate []  High   ?  [x]  Low Complexity []  Moderate   Complexity []  High Complexity      Treatment Charges: Mins Units Time In/Out   Evaluation  []  High  []  Moderate  [x]  Low  25  1    [x]  Ther Exercise 13 1    [x]  Manual Therapy 11 1    []  Ther Activities      []  Orthotic fit/train      []  Orthotic recheck      []        Total Treatment time 49 min Time In: 4796-4109    Electronically signed by TRISTEN Zarco on 3/8/2021 at 11:07 AM    Physician Signature: _________________________ Date: _______________  By signing above or cosigning this note, I have reviewed this plan of care and certify a need for medically necessary rehabilitation services.      *PLEASE SIGN ABOVE AND FAX BACK ALL PAGES*

## 2021-03-10 ENCOUNTER — HOSPITAL ENCOUNTER (OUTPATIENT)
Dept: OCCUPATIONAL THERAPY | Age: 36
Setting detail: THERAPIES SERIES
Discharge: HOME | End: 2021-03-10
Payer: COMMERCIAL

## 2021-03-10 PROCEDURE — 97035 APP MDLTY 1+ULTRASOUND EA 15: CPT

## 2021-03-10 PROCEDURE — 97140 MANUAL THERAPY 1/> REGIONS: CPT

## 2021-03-10 PROCEDURE — 97110 THERAPEUTIC EXERCISES: CPT

## 2021-03-10 NOTE — FLOWSHEET NOTE
[x] [facility]       Occupational Therapy            1st floor       [address], [place]         Phone: [phone]       Fax: [phone] [] [facility]  [address] [place] [address]  Phone: [phone]  Fax: [phone]      Occupational Therapy Daily Treatment Note    Date:  3/10/2021  Patient Name:  Gilberto Chinchilla    :  1985  MRN: [id number]  Patient: Gilberto Chinchilla             : 1985                      MRN: [id number]  Referring Provider:  Mery Melo MD  Insurance: Other BCBS 180/180 life time amount   Medical Diagnosis: displaced fracture of proximal phalanx of R little finger, subsequent encounter for fracture with routine healing S62.616D            Rehab Codes: pain in hand M79.646,, stiffness in hand M25.64,, lack of coordination R27.8,, fine motor skills loss R29.818,, muscle weakness generalized M62.81, or adherent scar L90.5,  Onset Date: 21                Next Dr. Palacios Sero: 3-30-21  Visit# / total visits: ; Progress note for Medicare patient due at visit 9-10   Cancels/No Shows: 0/0      Subjective:    Pain:  [x] Yes  [] No Location: dorsal side of R small digit MCP Pain Rating: (0-10 scale) 4/10  Pain altered Tx:  [x] No  [] Yes  Action:  Pt Comments:\"it is not as painful as it was before\"    Objective:  Modalities:Modality Flow Sheet:  START STOP Tx Modality     Electrical Stim:     3-10-21  Ultrasound: _.8__ W/cm2 x __8_ mins  Duty factor: _x_100%  __50%  __33% __20%  Head size:    __2_ cm  MHz: __1mHz  _x_3mHz  Location: dorsal side of L      Hot Pack:     Cold Pack:     Precautions:  Exercises:  EXERCISE    REPS/     TIME  WEIGHT/    LEVEL COMMENTS   AROM 10  completed   PROM MCP, PIP, DIP 30 mins  completed   Towel Crunches 10  Added and issued   Active Blocked Digits 10  Added and issued                     Other: Pt tolerated PROM this date with noted crepitus and popping of the joint.  Pt Issued Towel crunches and Active Blocked Digits for home use. Educated on importance of the Active Blocked Digits with good return. Treatment Charges: Mins Units   []  Modalities:      [x]  Ultrasound 8 1   [x]  Ther Exercise 15 1   [x]  Manual Therapy 30 2   []  Ther Activities     []  Orthotic fit/train     []  Orthotic recheck     []  Other     Total Treatment time 53          Assessment: [x] Progressing toward goals. [] No change. [] Other     [x] Patient would benefit from skilled occupational therapy services in order to: Improve  functional /grasp, Motor control/Tone in UE, ROM, Strength, Activity tolerance, Coordination deficit and Complaint of pain in order to Ensure safe return to work     Short Term Goals: (  12    Treatments)  1. Decrease Pain: to 4/10 with simple grooming tasks at home  2. Increase AROM (degrees) (extension/flexion)  a. R small digit MCP flexion to 30 for increased promotion for loose fist  b. R small digit PIP flexion to 30 for increased promotion for loose fist  c. R small DIP flexion to 25 for increased promotion of loose fist  3. Increase strength (pounds);  a. R  strength to 55 for increased I with home care tasks (open jars)  b. R Lateral pinch to 22 for increased I with heavy home tasks  c. R 2 point pinch to 15 for increased I with heavy home tasks  d. R 3 jaw pinch to 24 for increased I with heavy home tasks  4. Increase function:UE Functional Index Score 54 or more points to promote increased functional abilities  5. Decrease Edema: of R small digit at P1 to 7.0 and P2 to 5.8   6. Patient to be independent with home exercise program as demonstrated by performance with correct form without cues.     Long Term Goals: (  24  Treatments)  1. Scar will be soft and pliable with minimal tethering. 7. 2. Increase AROM (degrees) (extension/flexion)  a.  R small digit MCP flexion to 75 for increased promotion for loose fist  b. R small digit PIP flexion to 65 for increased promotion for loose fist  c. R small DIP flexion to 50 for increased promotion of loose fist  8. Increase strength (pounds);  a. R  strength to 65 for increased I with home care tasks (open jars)  b. R Lateral pinch to 25 for increased I with heavy home tasks  c. R 2 point pinch to 17 for increased I with heavy home tasks              R 3 jaw pinch to 24 for increased I with heavy home tasks    Pt. Education:  [x] Yes  [] No  [x] Reviewed Prior HEP/Ed  Method of Education: [x] Verbal  [] Demo  [] Written  Re:  Comprehension of Education:  [] Verbalizes understanding. [x] Demonstrates understanding. [] Needs review. [x] Demonstrates/verbalizes HEP/Ed previously given. Plan: [x] Continue current frequency toward short and long term goals.   [x] Specific Instructions for subsequent treatments: plan to add in putty next visit for promotion of ROM and Strength   [] Other:       Time In: 9354-0224      Electronically signed by:  KARINA Bradford/TISHA

## 2021-03-12 ENCOUNTER — HOSPITAL ENCOUNTER (OUTPATIENT)
Dept: OCCUPATIONAL THERAPY | Age: 36
Setting detail: THERAPIES SERIES
Discharge: HOME | End: 2021-03-12
Payer: COMMERCIAL

## 2021-03-15 ENCOUNTER — HOSPITAL ENCOUNTER (OUTPATIENT)
Dept: OCCUPATIONAL THERAPY | Age: 36
Setting detail: THERAPIES SERIES
Discharge: HOME | End: 2021-03-15
Payer: COMMERCIAL

## 2021-03-15 PROCEDURE — 97140 MANUAL THERAPY 1/> REGIONS: CPT

## 2021-03-15 PROCEDURE — 97035 APP MDLTY 1+ULTRASOUND EA 15: CPT

## 2021-03-15 PROCEDURE — 97110 THERAPEUTIC EXERCISES: CPT

## 2021-03-15 NOTE — FLOWSHEET NOTE
[x] [facility]       Occupational Therapy            1st floor       [address], [place]         Phone: [phone]       Fax: [phone] [] [facility]  [address], [place]  Phone: [phone]  Fax: [phone]      Occupational Therapy Daily Treatment Note    Date:  3/15/2021  Patient Name:  Aashish Corona    :  1985  MRN: [id number]  Patient: Aashish Corona             : 1985                      MRN: [id number]  Referring Provider:  Isha Bryant MD  Insurance: Other [employer] 180/180 life time amount   Medical Diagnosis: displaced fracture of proximal phalanx of R little finger, subsequent encounter for fracture with routine healing S62.616D            Rehab Codes: pain in hand M79.646,, stiffness in hand M25.64,, lack of coordination R27.8,, fine motor skills loss R29.818,, muscle weakness generalized M62.81, or adherent scar L90.5,  Onset Date: 21                Next Dr. Beatriz Hurtado: 3-30-21  Visit# / total visits: 3/24; Progress note for Medicare patient due at visit 9-10   Cancels/No Shows: 0/0      Subjective:    Pain:  [x] Yes  [] No Location: dorsal side of R small digit MCP Pain Rating: (0-10 scale) 4/10  Pain altered Tx:  [x] No  [] Yes  Action:  Pt Comments:\"it only really aches after the exercises \"    Objective:  Modalities:Modality Flow Sheet:  START STOP Tx Modality     Electrical Stim:     3-10-21  Ultrasound: _.8__ W/cm2 x __8_ mins  Duty factor: _x_100%  __50%  __33% __20%  Head size:    __2_ cm  MHz: __1mHz  _x_3mHz  Location: dorsal side of L      Hot Pack:     Cold Pack:     Precautions:  Exercises:  EXERCISE    REPS/     TIME  WEIGHT/    LEVEL COMMENTS   AROM 10  completed   PROM MCP, PIP, DIP 30 mins  completed   Towel Crunches 10  completed   Active Blocked Digits 10  Completed   Pinch pins with foam blocks 1 series yellow Added this date thumb, small and ring finger               Other: Pt tolerated PROM this date with noted crepitus and popping of the joint. Pt completed towel crunches and active blocked digits for home use. Educated on importance of the Active Blocked Digits with good return. Initiated pinch pins with with small, ring and thumb for light strengthening. Treatment Charges: Mins Units   []  Modalities:      [x]  Ultrasound 8 1   []  Ther Exercise 15 1   [x]  Manual Therapy 23 2   []  Ther Activities     []  Orthotic fit/train     []  Orthotic recheck     []  Other     Total Treatment time 46          Assessment: [x] Progressing toward goals. [] No change. [] Other     [x] Patient would benefit from skilled occupational therapy services in order to: Improve  functional /grasp, Motor control/Tone in UE, ROM, Strength, Activity tolerance, Coordination deficit and Complaint of pain in order to Ensure safe return to work     Short Term Goals: (  12    Treatments)  1. Decrease Pain: to 4/10 with simple grooming tasks at home  2. Increase AROM (degrees) (extension/flexion)  a. R small digit MCP flexion to 30 for increased promotion for loose fist  b. R small digit PIP flexion to 30 for increased promotion for loose fist  c. R small DIP flexion to 25 for increased promotion of loose fist  3. Increase strength (pounds);  a. R  strength to 55 for increased I with home care tasks (open jars)  b. R Lateral pinch to 22 for increased I with heavy home tasks  c. R 2 point pinch to 15 for increased I with heavy home tasks  d. R 3 jaw pinch to 24 for increased I with heavy home tasks  4. Increase function:UE Functional Index Score 54 or more points to promote increased functional abilities  5. Decrease Edema: of R small digit at P1 to 7.0 and P2 to 5.8   6. Patient to be independent with home exercise program as demonstrated by performance with correct form without cues.     Long Term Goals: (  24  Treatments)  1. Scar will be soft and pliable with minimal tethering.   7. 2. Increase AROM (degrees) (extension/flexion)  a. R small digit MCP flexion to 75 for increased promotion for loose fist  b. R small digit PIP flexion to 65 for increased promotion for loose fist  c. R small DIP flexion to 50 for increased promotion of loose fist  8. Increase strength (pounds);  a. R  strength to 65 for increased I with home care tasks (open jars)  b. R Lateral pinch to 25 for increased I with heavy home tasks  c. R 2 point pinch to 17 for increased I with heavy home tasks              R 3 jaw pinch to 24 for increased I with heavy home tasks    Pt. Education:  [x] Yes  [] No  [x] Reviewed Prior HEP/Ed  Method of Education: [x] Verbal  [] Demo  [] Written  Re:  Comprehension of Education:  [] Verbalizes understanding. [x] Demonstrates understanding. [] Needs review. [x] Demonstrates/verbalizes HEP/Ed previously given. Plan: [x] Continue current frequency toward short and long term goals.   [x] Specific Instructions for subsequent treatments: plan to add in putty next visit for promotion of ROM and Strength   [] Other:       Time In: 9801-5613      Electronically signed by:  Roseann Hahn OTR/L

## 2021-03-17 ENCOUNTER — HOSPITAL ENCOUNTER (OUTPATIENT)
Dept: OCCUPATIONAL THERAPY | Age: 36
Setting detail: THERAPIES SERIES
Discharge: HOME | End: 2021-03-17
Payer: COMMERCIAL

## 2021-03-17 PROCEDURE — 97035 APP MDLTY 1+ULTRASOUND EA 15: CPT

## 2021-03-17 PROCEDURE — 97110 THERAPEUTIC EXERCISES: CPT

## 2021-03-17 PROCEDURE — 97140 MANUAL THERAPY 1/> REGIONS: CPT

## 2021-03-17 NOTE — FLOWSHEET NOTE
thumb, small and ring finger   Theraputty   Strength  Finger flexion  Finger extension  Radial Dev  Ulnar Dev  Palmar Pinch  Lateral Pinch  Thumb ext  Three jaw brad  Finger thumb ext     yellow 10 nitiated with  strength this date, plan to add all other exercises           Other: Pt tolerated PROM this date with noted crepitus and popping of the joint. Pt completed towel crunches and active blocked digits for increased I with HEP. Completed pinch pins with with small, ring and thumb for light strengthening. Added in putty this date for  strength. Plan to continue with all putty exercises on next visit to promote increased strength. Treatment Charges: Mins Units   []  Modalities:      [x]  Ultrasound 8 1   [x]  Ther Exercise 20 1   [x]  Manual Therapy 30 2   []  Ther Activities     []  Orthotic fit/train     []  Orthotic recheck     []  Other     Total Treatment time 58 4         Assessment: [x] Progressing toward goals. [] No change. [] Other     [x] Patient would benefit from skilled occupational therapy services in order to: Improve  functional /grasp, Motor control/Tone in UE, ROM, Strength, Activity tolerance, Coordination deficit and Complaint of pain in order to Ensure safe return to work     Short Term Goals: (  12    Treatments)  1. Decrease Pain: to 4/10 with simple grooming tasks at home  2. Increase AROM (degrees) (extension/flexion)  a. R small digit MCP flexion to 30 for increased promotion for loose fist  b. R small digit PIP flexion to 30 for increased promotion for loose fist  c. R small DIP flexion to 25 for increased promotion of loose fist  3. Increase strength (pounds);  a. R  strength to 55 for increased I with home care tasks (open jars)  b. R Lateral pinch to 22 for increased I with heavy home tasks  c. R 2 point pinch to 15 for increased I with heavy home tasks  d. R 3 jaw pinch to 24 for increased I with heavy home tasks  4.  Increase function:UE Functional Index Score 54 or more points to promote increased functional abilities  5. Decrease Edema: of R small digit at P1 to 7.0 and P2 to 5.8   6. Patient to be independent with home exercise program as demonstrated by performance with correct form without cues.     Long Term Goals: (  24  Treatments)  1. Scar will be soft and pliable with minimal tethering. 7. 2. Increase AROM (degrees) (extension/flexion)  a. R small digit MCP flexion to 75 for increased promotion for loose fist  b. R small digit PIP flexion to 65 for increased promotion for loose fist  c. R small DIP flexion to 50 for increased promotion of loose fist  8. Increase strength (pounds);  a. R  strength to 65 for increased I with home care tasks (open jars)  b. R Lateral pinch to 25 for increased I with heavy home tasks  c. R 2 point pinch to 17 for increased I with heavy home tasks              R 3 jaw pinch to 24 for increased I with heavy home tasks    Pt. Education:  [x] Yes  [] No  [x] Reviewed Prior HEP/Ed  Method of Education: [x] Verbal  [] Demo  [] Written  Re:  Comprehension of Education:  [] Verbalizes understanding. [x] Demonstrates understanding. [] Needs review. [x] Demonstrates/verbalizes HEP/Ed previously given. Plan: [x] Continue current frequency toward short and long term goals.   [x] Specific Instructions for subsequent treatments: plan to continue putty next visit for promotion of ROM and Strength   [] Other:       Time In: 7784-7159      Electronically signed by:  KARINA Cleveland/TISHA

## 2021-03-19 ENCOUNTER — HOSPITAL ENCOUNTER (OUTPATIENT)
Dept: OCCUPATIONAL THERAPY | Age: 36
Setting detail: THERAPIES SERIES
Discharge: HOME | End: 2021-03-19
Payer: COMMERCIAL

## 2021-03-19 PROCEDURE — 97110 THERAPEUTIC EXERCISES: CPT

## 2021-03-19 PROCEDURE — 97140 MANUAL THERAPY 1/> REGIONS: CPT

## 2021-03-19 PROCEDURE — 97530 THERAPEUTIC ACTIVITIES: CPT

## 2021-03-19 NOTE — FLOWSHEET NOTE
[x] [facility]       Occupational Therapy            1st floor       [address], [place]         Phone: [phone]       Fax: [phone] [] [facility]  [address], [place]  Phone: [phone]  Fax: [phone]      Occupational Therapy Daily Treatment Note    Date:  3/19/2021  Patient Name:  Aashish Corona    :  1985  MRN: [id number]  Patient: Aashish Corona             : 1985                      MRN: [id number]  Referring Provider:  Isha Bryant MD  Insurance: Other BCBS 180/180 life time amount   Medical Diagnosis: displaced fracture of proximal phalanx of R little finger, subsequent encounter for fracture with routine healing S62.616D            Rehab Codes: pain in hand M79.646,, stiffness in hand M25.64,, lack of coordination R27.8,, fine motor skills loss R29.818,, muscle weakness generalized M62.81, or adherent scar L90.5,  Onset Date: 21                Next Dr. Beatriz Hurtado: 3-30-21  Visit# / total visits: ; Progress note for Medicare patient due at visit 9-10   Cancels/No Shows: 0/0      Subjective:    Pain:  [x] Yes  [] No Location: dorsal side of R small digit MCP Pain Rating: (0-10 scale) 4/10  Pain altered Tx:  [x] No  [] Yes  Action:  Pt Comments: \"It really is starting to move more isn't it \"    Objective:  Modalities:Modality Flow Sheet:  START STOP Tx Modality     Electrical Stim:     3-10-21  Ultrasound: _.8__ W/cm2 x __8_ mins  Duty factor: _x_100%  __50%  __33% __20%  Head size:    __2_ cm  MHz: __1mHz  _x_3mHz  Location: dorsal side of L      Hot Pack:     Cold Pack:     Precautions:  Exercises:  EXERCISE    REPS/     TIME  WEIGHT/    LEVEL COMMENTS   AROM 10  completed   PROM MCP, PIP, DIP 30 mins  completed   Towel Crunches 10  completed   Active Blocked Digits 10  Completed   Pinch pins with foam blocks 1 series yellow completed thumb, small and ring finger   Theraputty   Strength  Finger flexion  Finger extension  Radial Dev  Ulnar Dev  Palmar Pinch  Lateral Pinch  Thumb ext  Three jaw brad  Finger thumb ext     yellow 10 compelted all exercises and issued putty for home completion   Finger platter for Active blocked digits   initiated 10 on each jt     Other: Pt tolerated PROM this date with noted crepitus and popping of the joint. Pt completed towel crunches and active blocked digits for increased I with HEP. Completed pinch pins with with small, ring and thumb for light strengthening. Completed putty this date for hand strength. Treatment Charges: Mins Units   []  Modalities:      []  Ultrasound     [x]  Ther Exercise 25 2   [x]  Manual Therapy 30 2   []  Ther Activities     []  Orthotic fit/train     []  Orthotic recheck     []  Other     Total Treatment time 55 4         Assessment: [x] Progressing toward goals. [] No change. [] Other     [x] Patient would benefit from skilled occupational therapy services in order to: Improve  functional /grasp, Motor control/Tone in UE, ROM, Strength, Activity tolerance, Coordination deficit and Complaint of pain in order to Ensure safe return to work     Short Term Goals: (  12    Treatments)  1. Decrease Pain: to 4/10 with simple grooming tasks at home  2. Increase AROM (degrees) (extension/flexion)  a. R small digit MCP flexion to 30 for increased promotion for loose fist  b. R small digit PIP flexion to 30 for increased promotion for loose fist  c. R small DIP flexion to 25 for increased promotion of loose fist  3. Increase strength (pounds);  a. R  strength to 55 for increased I with home care tasks (open jars)  b. R Lateral pinch to 22 for increased I with heavy home tasks  c. R 2 point pinch to 15 for increased I with heavy home tasks  d. R 3 jaw pinch to 24 for increased I with heavy home tasks  4. Increase function:UE Functional Index Score 54 or more points to promote increased functional abilities  5.  Decrease Edema: of R small digit at P1 to 7.0 and P2 to 5.8   6. Patient to be independent with home exercise program as demonstrated by performance with correct form without cues.     Long Term Goals: (  24  Treatments)  1. Scar will be soft and pliable with minimal tethering. 7. 2. Increase AROM (degrees) (extension/flexion)  a. R small digit MCP flexion to 75 for increased promotion for loose fist  b. R small digit PIP flexion to 65 for increased promotion for loose fist  c. R small DIP flexion to 50 for increased promotion of loose fist  8. Increase strength (pounds);  a. R  strength to 65 for increased I with home care tasks (open jars)  b. R Lateral pinch to 25 for increased I with heavy home tasks  c. R 2 point pinch to 17 for increased I with heavy home tasks              R 3 jaw pinch to 24 for increased I with heavy home tasks    Pt. Education:  [x] Yes  [] No  [x] Reviewed Prior HEP/Ed  Method of Education: [x] Verbal  [] Demo  [] Written  Re:  Comprehension of Education:  [] Verbalizes understanding. [x] Demonstrates understanding. [] Needs review. [x] Demonstrates/verbalizes HEP/Ed previously given. Plan: [x] Continue current frequency toward short and long term goals.   [x] Specific Instructions for subsequent treatments: plan to upgrade putty next visit for promotion of ROM and Strength   [] Other:       Time In: 6771-9500      Electronically signed by:  KARINA Da Silva/TISHA

## 2021-03-22 ENCOUNTER — HOSPITAL ENCOUNTER (OUTPATIENT)
Dept: OCCUPATIONAL THERAPY | Age: 36
Setting detail: THERAPIES SERIES
Discharge: HOME | End: 2021-03-22
Payer: COMMERCIAL

## 2021-03-22 PROCEDURE — 97110 THERAPEUTIC EXERCISES: CPT

## 2021-03-22 PROCEDURE — 97140 MANUAL THERAPY 1/> REGIONS: CPT

## 2021-03-22 NOTE — FLOWSHEET NOTE
[x] [facility], [place]       Occupational Therapy            1st floor       [address] [facility]         Phone: [phone]       Fax: [phone] [] [facility]  [address]   [facility]  Phone: [phone]  Fax: [phone]      Occupational Therapy Daily Treatment Note    Date:  3/22/2021  Patient Name:  Dale Abrams    :  1985  MRN: [id number]  Patient: Dale Abrams             : 1985                      MRN: [id number]  Referring Provider:  Nancy Ruvalcaba MD  Insurance: Other BCBS 180/180 life time amount   Medical Diagnosis: displaced fracture of proximal phalanx of R little finger, subsequent encounter for fracture with routine healing S62.616D            Rehab Codes: pain in hand M79.646,, stiffness in hand M25.64,, lack of coordination R27.8,, fine motor skills loss R29.818,, muscle weakness generalized M62.81, or adherent scar L90.5,  Onset Date: 21                Next Dr. Mary Howe: 3-30-21  Visit# / total visits: ; Progress note for Medicare patient due at visit 9-10   Cancels/No Shows: 0/0      Subjective:    Pain:  [x] Yes  [] No Location: dorsal side of R small digit MCP Pain Rating: (0-10 scale) 2/10  Pain altered Tx:  [x] No  [] Yes  Action:  Pt Comments:\"I have been putting my house together\"    Objective:  Modalities:Modality Flow Sheet:  START STOP Tx Modality     Electrical Stim:     3-10-21  Ultrasound: _.8__ W/cm2 x __8_ mins  Duty factor: _x_100%  __50%  __33% __20%  Head size:    __2_ cm  MHz: __1mHz  _x_3mHz  Location: dorsal side of L      Hot Pack:     Cold Pack:     Precautions:  Exercises:  EXERCISE    REPS/     TIME  WEIGHT/    LEVEL COMMENTS   AROM 10  completed   PROM MCP, PIP, DIP 30 mins  completed   Towel Crunches 10  completed   Active Blocked Digits 10  Completed   Pinch pins with foam blocks 1 series yellow completed thumb, small and ring finger   Theraputty   Strength  Finger flexion  Finger extension  Radial Dev  Ulnar Dev  Palmar Pinch  Lateral Pinch  Thumb ext  Three jaw brad  Finger thumb ext     Yellow  Not completed (putty in car) 10 compelted all exercises and issued putty for home completion   Finger platter for Active blocked digits   initiated 10 on each jt   Radial release marbles 1 series  Added this date     Other: Pt tolerated PROM this date with noted crepitus and popping of the joint. Pt completed towel crunches and active blocked digits for increased I with HEP. Completed pinch pins with with small, ring and thumb for light strengthening. Completed ulnar release marbles for promotion of increased involvement this date for hand ROM. Treatment Charges: Mins Units   []  Modalities:      [x]  Ultrasound 8 0   [x]  Ther Exercise 25 2   [x]  Manual Therapy 26 2   []  Ther Activities     []  Orthotic fit/train     []  Orthotic recheck     []  Other     Total Treatment time 55 4         Assessment: [x] Progressing toward goals. [] No change. [] Other     [x] Patient would benefit from skilled occupational therapy services in order to: Improve  functional /grasp, Motor control/Tone in UE, ROM, Strength, Activity tolerance, Coordination deficit and Complaint of pain in order to Ensure safe return to work     Short Term Goals: (  12    Treatments)  1. Decrease Pain: to 4/10 with simple grooming tasks at home  2. Increase AROM (degrees) (extension/flexion)  a. R small digit MCP flexion to 30 for increased promotion for loose fist  b. R small digit PIP flexion to 30 for increased promotion for loose fist  c. R small DIP flexion to 25 for increased promotion of loose fist  3. Increase strength (pounds);  a. R  strength to 55 for increased I with home care tasks (open jars)  b. R Lateral pinch to 22 for increased I with heavy home tasks  c. R 2 point pinch to 15 for increased I with heavy home tasks  d.  R 3 jaw pinch to 24 for increased I with heavy home tasks  4. Increase function:UE Functional Index Score 54 or more points to promote increased functional abilities  5. Decrease Edema: of R small digit at P1 to 7.0 and P2 to 5.8   6. Patient to be independent with home exercise program as demonstrated by performance with correct form without cues.     Long Term Goals: (  24  Treatments)  1. Scar will be soft and pliable with minimal tethering. 7. 2. Increase AROM (degrees) (extension/flexion)  a. R small digit MCP flexion to 75 for increased promotion for loose fist  b. R small digit PIP flexion to 65 for increased promotion for loose fist  c. R small DIP flexion to 50 for increased promotion of loose fist  8. Increase strength (pounds);  a. R  strength to 65 for increased I with home care tasks (open jars)  b. R Lateral pinch to 25 for increased I with heavy home tasks  c. R 2 point pinch to 17 for increased I with heavy home tasks              R 3 jaw pinch to 24 for increased I with heavy home tasks    Pt. Education:  [x] Yes  [] No  [x] Reviewed Prior HEP/Ed  Method of Education: [x] Verbal  [] Demo  [] Written  Re:  Comprehension of Education:  [] Verbalizes understanding. [x] Demonstrates understanding. [] Needs review. [x] Demonstrates/verbalizes HEP/Ed previously given. Plan: [x] Continue current frequency toward short and long term goals.   [x] Specific Instructions for subsequent treatments: plan to upgrade putty next visit for promotion of ROM and Strength   [] Other:       Time In: 3435-5090      Electronically signed by:  KARINA Pillai/TISHA

## 2021-03-24 ENCOUNTER — HOSPITAL ENCOUNTER (OUTPATIENT)
Dept: OCCUPATIONAL THERAPY | Age: 36
Setting detail: THERAPIES SERIES
Discharge: HOME | End: 2021-03-24
Payer: COMMERCIAL

## 2021-03-24 PROCEDURE — 97110 THERAPEUTIC EXERCISES: CPT

## 2021-03-24 PROCEDURE — 97140 MANUAL THERAPY 1/> REGIONS: CPT

## 2021-03-24 NOTE — FLOWSHEET NOTE
[x] [facility]       Occupational Therapy            1st floor       [address], [place]         Phone: [phone]       Fax: [phone] [] [facility]  [address] [place] [address]  Phone: [phone]  Fax: [phone]      Occupational Therapy Daily Treatment Note    Date:  3/24/2021  Patient Name:  Jo Ann Peralta    :  1985  MRN: [id number]  Patient: Jo Ann Peralta             : 1985                      MRN: [id number]  Referring Provider:  Deyanira Casey MD  Insurance: Other BCBS 180/180 life time amount   Medical Diagnosis: displaced fracture of proximal phalanx of R little finger, subsequent encounter for fracture with routine healing S62.616D            Rehab Codes: pain in hand M79.646,, stiffness in hand M25.64,, lack of coordination R27.8,, fine motor skills loss R29.818,, muscle weakness generalized M62.81, or adherent scar L90.5,  Onset Date: 21                Next Dr. Mars Labrador: 3-30-21  Visit# / total visits: ; Progress note for Medicare patient due at visit 9-10   Cancels/No Shows: 0/0      Subjective:    Pain:  [x] Yes  [] No Location: dorsal side of R small digit MCP Pain Rating: (0-10 scale) 2/10  Pain altered Tx:  [x] No  [] Yes  Action:  Pt Comments:\"I am feeling pretty good today\"    Objective:  Modalities:Modality Flow Sheet:  START STOP Tx Modality     Electrical Stim:     3-10-21  Ultrasound: _.8__ W/cm2 x __8_ mins  Duty factor: _x_100%  __50%  __33% __20%  Head size:    __2_ cm  MHz: __1mHz  _x_3mHz  Location: dorsal side of L      Hot Pack:     Cold Pack:     Precautions:  Exercises:  EXERCISE    REPS/     TIME  WEIGHT/    LEVEL COMMENTS   AROM 10  completed   PROM MCP, PIP, DIP 30 mins  completed   Towel Crunches 10  completed   Active Blocked Digits 10  Completed   Pinch pins with foam blocks 1 series Red 2 pounds increased resistance this date completed thumb, small and ring finger Theraputty   Strength  Finger flexion  Finger extension  Radial Dev  Ulnar Dev  Palmar Pinch  Lateral Pinch  Thumb ext  Three jaw brad  Finger thumb ext     Yellow  Not completed (putty in car) 10 compelted all exercises and issued putty for home completion   Finger platter for Active blocked digits   initiated 10 on each jt   Radial release marbles 1 series  Added this date     Other: Completed ulnar release marbles for promotion of increased involvement this date for hand ROM with increased ease. Pt demo increased ROM of PIP and DIP of small R digit. Treatment Charges: Mins Units   []  Modalities:      [x]  Ultrasound 8 0   [x]  Ther Exercise 23 2   [x]  Manual Therapy 25 2   []  Ther Activities     []  Orthotic fit/train     []  Orthotic recheck     []  Other     Total Treatment time 55 4         Assessment: [x] Progressing toward goals. [] No change. [] Other     [x] Patient would benefit from skilled occupational therapy services in order to: Improve  functional /grasp, Motor control/Tone in UE, ROM, Strength, Activity tolerance, Coordination deficit and Complaint of pain in order to Ensure safe return to work     Short Term Goals: (  12    Treatments)  1. Decrease Pain: to 4/10 with simple grooming tasks at home  2. Increase AROM (degrees) (extension/flexion)  a. R small digit MCP flexion to 30 for increased promotion for loose fist  b. R small digit PIP flexion to 30 for increased promotion for loose fist  c. R small DIP flexion to 25 for increased promotion of loose fist  3. Increase strength (pounds);  a. R  strength to 55 for increased I with home care tasks (open jars)  b. R Lateral pinch to 22 for increased I with heavy home tasks  c. R 2 point pinch to 15 for increased I with heavy home tasks  d. R 3 jaw pinch to 24 for increased I with heavy home tasks  4. Increase function:UE Functional Index Score 54 or more points to promote increased functional abilities  5.  Decrease Edema: of R small digit at P1 to 7.0 and P2 to 5.8   6. Patient to be independent with home exercise program as demonstrated by performance with correct form without cues.     Long Term Goals: (  24  Treatments)  1. Scar will be soft and pliable with minimal tethering. 7. 2. Increase AROM (degrees) (extension/flexion)  a. R small digit MCP flexion to 75 for increased promotion for loose fist  b. R small digit PIP flexion to 65 for increased promotion for loose fist  c. R small DIP flexion to 50 for increased promotion of loose fist  8. Increase strength (pounds);  a. R  strength to 65 for increased I with home care tasks (open jars)  b. R Lateral pinch to 25 for increased I with heavy home tasks  c. R 2 point pinch to 17 for increased I with heavy home tasks              R 3 jaw pinch to 24 for increased I with heavy home tasks    Pt. Education:  [x] Yes  [] No  [x] Reviewed Prior HEP/Ed  Method of Education: [x] Verbal  [] Demo  [] Written  Re:  Comprehension of Education:  [] Verbalizes understanding. [x] Demonstrates understanding. [] Needs review. [x] Demonstrates/verbalizes HEP/Ed previously given. Plan: [x] Continue current frequency toward short and long term goals.   [x] Specific Instructions for subsequent treatments: plan to upgrade putty next visit for promotion of ROM and Strength   [] Other:       Time In: 5962-6544      Electronically signed by:  KARINA Eckert/TISHA

## 2021-03-26 ENCOUNTER — HOSPITAL ENCOUNTER (OUTPATIENT)
Dept: OCCUPATIONAL THERAPY | Age: 36
Setting detail: THERAPIES SERIES
Discharge: HOME | End: 2021-03-26
Payer: COMMERCIAL

## 2021-03-26 PROCEDURE — 97140 MANUAL THERAPY 1/> REGIONS: CPT

## 2021-03-26 PROCEDURE — 97110 THERAPEUTIC EXERCISES: CPT

## 2021-03-26 NOTE — FLOWSHEET NOTE
[x] [facility]       Occupational Therapy            1st floor       [address], [place]         Phone: [phone]       Fax: [phone] [] [facility]  [address] [place] [address]  Phone: [phone]  Fax: [phone]      Occupational Therapy Daily Treatment Note    Date:  3/26/2021  Patient Name:  Margareth Galicia    :  1985  MRN: [id number]  Patient: Margareth Galicia             : 1985                      MRN: [id number]  Referring Provider:  Bethany Kelley MD  Insurance: Other BCBS 180/180 life time amount   Medical Diagnosis: displaced fracture of proximal phalanx of R little finger, subsequent encounter for fracture with routine healing S62.616D            Rehab Codes: pain in hand M79.646,, stiffness in hand M25.64,, lack of coordination R27.8,, fine motor skills loss R29.818,, muscle weakness generalized M62.81, or adherent scar L90.5,  Onset Date: 21                Next Dr. Efrain Calles: 3-30-21  Visit# / total visits: ; Progress note for Medicare patient due at visit 9-10   Cancels/No Shows: 0/0      Subjective:    Pain:  [x] Yes  [] No Location: dorsal side of R small digit MCP Pain Rating: (0-10 scale) 0/10 at rest  Pain altered Tx:  [x] No  [] Yes  Action:  Pt Comments: \"The pain in damn near non existent when I don't move\"    Objective:  Modalities:Modality Flow Sheet:  START STOP Tx Modality     Electrical Stim:     3-10-21  Ultrasound: _.8__ W/cm2 x __8_ mins  Duty factor: _x_100%  __50%  __33% __20%  Head size:    __2_ cm  MHz: __1mHz  _x_3mHz  Location: dorsal side of L      Hot Pack:     Cold Pack:     Precautions:  Exercises:  EXERCISE    REPS/     TIME  WEIGHT/    LEVEL COMMENTS   AROM 10  completed   PROM MCP, PIP, DIP 30 mins  completed   Towel Crunches 10  completed   Active Blocked Digits 10  Completed   Pinch pins with foam blocks 1 series Red 2 pounds  completed thumb, small and ring finger increased functional abilities  5. Decrease Edema: of R small digit at P1 to 7.0 and P2 to 5.8   6. Patient to be independent with home exercise program as demonstrated by performance with correct form without cues.     Long Term Goals: (  24  Treatments)  1. Scar will be soft and pliable with minimal tethering. 7. 2. Increase AROM (degrees) (extension/flexion)  a. R small digit MCP flexion to 75 for increased promotion for loose fist  b. R small digit PIP flexion to 65 for increased promotion for loose fist  c. R small DIP flexion to 50 for increased promotion of loose fist  8. Increase strength (pounds);  a. R  strength to 65 for increased I with home care tasks (open jars)  b. R Lateral pinch to 25 for increased I with heavy home tasks  c. R 2 point pinch to 17 for increased I with heavy home tasks              R 3 jaw pinch to 24 for increased I with heavy home tasks    Pt. Education:  [x] Yes  [] No  [x] Reviewed Prior HEP/Ed  Method of Education: [x] Verbal  [] Demo  [] Written  Re:  Comprehension of Education:  [] Verbalizes understanding. [x] Demonstrates understanding. [] Needs review. [x] Demonstrates/verbalizes HEP/Ed previously given. Plan: [x] Continue current frequency toward short and long term goals.   [x] Specific Instructions for subsequent treatments: plan to upgrade putty next visit for promotion of ROM and Strength   [] Other:     Time In: 6871-3974     Electronically signed by:  KARINA Jaimes/TISHA

## 2021-03-29 ENCOUNTER — HOSPITAL ENCOUNTER (OUTPATIENT)
Dept: OCCUPATIONAL THERAPY | Age: 36
Setting detail: THERAPIES SERIES
Discharge: HOME | End: 2021-03-29
Payer: COMMERCIAL

## 2021-03-29 PROCEDURE — 97140 MANUAL THERAPY 1/> REGIONS: CPT

## 2021-03-29 PROCEDURE — 97110 THERAPEUTIC EXERCISES: CPT

## 2021-03-29 NOTE — FLOWSHEET NOTE
[x] [facility]       Occupational Therapy            1st floor       [address] [place], [place]         Phone: [phone]       Fax: [phone] [] [facility]  [address] [place] [address]  Phone: [phone]  Fax: [phone]      Occupational Therapy Daily Treatment Note    Date:  3/29/2021  Patient Name:  Julian Lopez    :  1985  MRN: [id number]  Patient: Julian Lopez             : 1985                      MRN: [id number]  Referring Provider:  Jessica Garcia MD  Insurance: Other BCBS 180/180 life time amount   Medical Diagnosis: displaced fracture of proximal phalanx of R little finger, subsequent encounter for fracture with routine healing S62.616D            Rehab Codes: pain in hand M79.646,, stiffness in hand M25.64,, lack of coordination R27.8,, fine motor skills loss R29.818,, muscle weakness generalized M62.81, or adherent scar L90.5,  Onset Date: 21                Next Dr. Taylor Ayers: 3-30-21  Visit# / total visits: ; Progress note for Medicare patient due at visit -10   Cancels/No Shows: 0/0      Subjective:    Pain:  [x] Yes  [] No Location: dorsal side of R small digit MCP Pain Rating: (0-10 scale) 0/10 at rest  Pain altered Tx:  [x] No  [] Yes  Action:  Pt Comments:\"\"    Objective:  Modalities:Modality Flow Sheet:  START STOP Tx Modality     Electrical Stim:     3-10-21  Ultrasound: _.8__ W/cm2 x __8_ mins  Duty factor: _x_100%  __50%  __33% __20%  Head size:    __2_ cm  MHz: __1mHz  _x_3mHz  Location: dorsal side of L      Hot Pack:     Cold Pack:     Precautions:  Exercises:  EXERCISE    REPS/     TIME  WEIGHT/    LEVEL COMMENTS   AROM 10  completed   PROM MCP, PIP, DIP 30 mins  completed   Towel Crunches 10  completed   Active Blocked Digits 10  Completed   Pinch pins with foam blocks 1 series Red 2 pounds  completed thumb, small and ring finger   Theraputty   Strength  Finger flexion  Finger extension  Radial Dev  Ulnar Dev  Palmar Pinch  Lateral Pinch  Thumb ext  Three jaw brad  Finger thumb ext     Yellow  Not completed (putty in car) 10 compelted all exercises and issued putty for home completion   Finger platter for Active blocked digits    10 on each jt   Radial release marbles 1 series  Not completed   Flexbar   strength  Wrist Flexion/Ext  Wrist pronation  Wrist supination  Wrist ulnar deviation  Wrist radial dev  Shoulder Adb  Shoulder Add Red 10 pounds   added and completed this date     Other: Pt demo active DIP flexion with finger platter this date. Pt initiated to flexbar this date. Good return. Treatment Charges: Mins Units   []  Modalities:      [x]  Ultrasound 8 0   [x]  Ther Exercise 38 3   [x]  Manual Therapy 10 1   []  Ther Activities     []  Orthotic fit/train     []  Orthotic recheck     []  Other     Total Treatment time 55 4         Assessment: [x] Progressing toward goals. [] No change. [] Other     [x] Patient would benefit from skilled occupational therapy services in order to: Improve  functional /grasp, Motor control/Tone in UE, ROM, Strength, Activity tolerance, Coordination deficit and Complaint of pain in order to Ensure safe return to work     Short Term Goals: (  12    Treatments)  1. Decrease Pain: to 4/10 with simple grooming tasks at home  2. Increase AROM (degrees) (extension/flexion)  a. R small digit MCP flexion to 30 for increased promotion for loose fist  b. R small digit PIP flexion to 30 for increased promotion for loose fist  c. R small DIP flexion to 25 for increased promotion of loose fist  3. Increase strength (pounds);  a. R  strength to 55 for increased I with home care tasks (open jars)  b. R Lateral pinch to 22 for increased I with heavy home tasks  c. R 2 point pinch to 15 for increased I with heavy home tasks  d. R 3 jaw pinch to 24 for increased I with heavy home tasks  4.  Increase function:UE Functional Index Score 54 or more

## 2021-03-30 ENCOUNTER — OFFICE VISIT (OUTPATIENT)
Dept: BURN CARE | Age: 36
End: 2021-03-30
Payer: COMMERCIAL

## 2021-03-30 VITALS
SYSTOLIC BLOOD PRESSURE: 147 MMHG | HEART RATE: 82 BPM | HEIGHT: 72 IN | WEIGHT: 292 LBS | BODY MASS INDEX: 39.55 KG/M2 | DIASTOLIC BLOOD PRESSURE: 102 MMHG

## 2021-03-30 DIAGNOSIS — S62.616D CLOSED DISPLACED FRACTURE OF PROXIMAL PHALANX OF RIGHT LITTLE FINGER WITH ROUTINE HEALING, SUBSEQUENT ENCOUNTER: Primary | ICD-10-CM

## 2021-03-30 PROCEDURE — 99024 POSTOP FOLLOW-UP VISIT: CPT | Performed by: PLASTIC SURGERY

## 2021-03-30 NOTE — PROGRESS NOTES
Is and is doing very well he is very tender in the small finger. 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 I have asked him to come back in 6 weeks time and continue therapy. If it goes as I expected to, he might be able to return to work at that time. Everything at this point looks good but he is tender and with limited range of motion at the PIP joint with only 45 degrees of flexion but this should improve dramatically.

## 2021-03-30 NOTE — PATIENT INSTRUCTIONS
OT for 6 more weeks  Works for [employer] -keep restrictions for 6 weeks -plan on return to work in 6 weeks

## 2021-03-31 ENCOUNTER — HOSPITAL ENCOUNTER (OUTPATIENT)
Dept: OCCUPATIONAL THERAPY | Age: 36
Setting detail: THERAPIES SERIES
Discharge: HOME | End: 2021-03-31
Payer: COMMERCIAL

## 2021-03-31 PROCEDURE — 97110 THERAPEUTIC EXERCISES: CPT

## 2021-03-31 PROCEDURE — 97035 APP MDLTY 1+ULTRASOUND EA 15: CPT

## 2021-03-31 NOTE — FLOWSHEET NOTE
[x] [facility]       Occupational Therapy            1st floor       [address] [place], [place]         Phone: [phone]       Fax: [phone] [] [facility]  [address] [place] [address]  Phone: [phone]  Fax: [phone]      Occupational Therapy Daily Treatment Note    Date:  3/31/2021  Patient Name:  Jonathan Rivero    :  1985  MRN: [id number]  Patient: Jonathan Rivero             : 1985                      MRN: [id number]  Referring Provider:  Shikha Polk MD  Insurance: Other BCBS 180/180 life time amount   Medical Diagnosis: displaced fracture of proximal phalanx of R little finger, subsequent encounter for fracture with routine healing S62.616D            Rehab Codes: pain in hand M79.646,, stiffness in hand M25.64,, lack of coordination R27.8,, fine motor skills loss R29.818,, muscle weakness generalized M62.81, or adherent scar L90.5,  Onset Date: 21                Next Dr. Tracy King: 3-30-21  Visit# / total visits: 10/24; Progress note for Medicare patient compelted at visit 10   Cancels/No Shows: 0/0      Subjective:    Pain:  [x] Yes  [] No Location: dorsal side of R small digit MCP Pain Rating: (0-10 scale) 0/10 at rest  Pain altered Tx:  [x] No  [] Yes  Action:  Pt Comments:\"I did come cleaning up yesterday and it is a little sore\"    Objective:  Modalities:Modality Flow Sheet:  START STOP Tx Modality     Electrical Stim:     3-10-21  Ultrasound: _.8__ W/cm2 x __8_ mins  Duty factor: _x_100%  __50%  __33% __20%  Head size:    __2_ cm  MHz: __1mHz  _x_3mHz  Location: dorsal side of L      Hot Pack:     Cold Pack:     Precautions:  Exercises:  EXERCISE    REPS/     TIME  WEIGHT/    LEVEL COMMENTS   AROM 10  NOT completed   PROM MCP, PIP, DIP 30 mins  NOT completed   Towel Crunches 10  Completed at home   Active Blocked Digits 10  Completed   Pinch pins with foam blocks 1 series Red 2 pounds  completed thumb, small and ring finger   Theraputty   Strength  Finger flexion  Finger extension  Radial Dev  Ulnar Dev  Palmar Pinch  Lateral Pinch  Thumb ext  Three jaw brad  Finger thumb ext     Yellow  Not completed (putty in car) 10 compelted all exercises and issued putty for home completion   Finger platter for Active blocked digits    10 on each jt   Radial release marbles 1 series  Not completed   Flexbar   strength  Wrist Flexion/Ext  Wrist pronation  Wrist supination  Wrist ulnar deviation  Wrist radial dev  Shoulder Adb  Shoulder Add Red 10 pounds    completed this date     Other: Pt demo active DIP flexion with finger platter this date. Good return. Treatment Charges: Mins Units   []  Modalities:      [x]  Ultrasound 8 1   [x]  Ther Exercise 39 3   [x]  Manual Therapy 5 0   []  Ther Activities     []  Orthotic fit/train     []  Orthotic recheck     []  Other     Total Treatment time 56 4     Assessment: [x] Progressing toward goals. [] No change. [] Other     [x] Patient would benefit from skilled occupational therapy services in order to: Improve  functional /grasp, Motor control/Tone in UE, ROM, Strength, Activity tolerance, Coordination deficit and Complaint of pain in order to Ensure safe return to work     Short Term Goals: (  12    Treatments)  1. Decrease Pain: to 4/10 with simple grooming tasks at home  2. Increase AROM (degrees) (extension/flexion)  a. R small digit MCP flexion to 30 for increased promotion for loose fist  b. R small digit PIP flexion to 30 for increased promotion for loose fist  c. R small DIP flexion to 25 for increased promotion of loose fist  3. Increase strength (pounds);  a. R  strength to 55 for increased I with home care tasks (open jars)  b. R Lateral pinch to 22 for increased I with heavy home tasks  c. R 2 point pinch to 15 for increased I with heavy home tasks  d. R 3 jaw pinch to 24 for increased I with heavy home tasks  4.  Increase function:UE Functional Index Score 54 or more points to promote increased functional abilities  5. Decrease Edema: of R small digit at P1 to 7.0 and P2 to 5.8   6. Patient to be independent with home exercise program as demonstrated by performance with correct form without cues.     Long Term Goals: (  24  Treatments)  1. Scar will be soft and pliable with minimal tethering. 7. 2. Increase AROM (degrees) (extension/flexion)  a. R small digit MCP flexion to 75 for increased promotion for loose fist  b. R small digit PIP flexion to 65 for increased promotion for loose fist  c. R small DIP flexion to 50 for increased promotion of loose fist  8. Increase strength (pounds);  a. R  strength to 65 for increased I with home care tasks (open jars)  b. R Lateral pinch to 25 for increased I with heavy home tasks  c. R 2 point pinch to 17 for increased I with heavy home tasks              R 3 jaw pinch to 24 for increased I with heavy home tasks    Pt. Education:  [x] Yes  [] No  [x] Reviewed Prior HEP/Ed  Method of Education: [x] Verbal  [] Demo  [] Written  Re:  Comprehension of Education:  [] Verbalizes understanding. [x] Demonstrates understanding. [] Needs review. [x] Demonstrates/verbalizes HEP/Ed previously given. Plan: [x] Continue current frequency toward short and long term goals.   [] Specific Instructions for subsequent treatments:    [] Other:     Time In: 7994-0623     Electronically signed by:  KARINA Olivares/TISHA

## 2021-03-31 NOTE — PROGRESS NOTES
[x] [facility]  [address]  [phone]  F: [phone] [] [facility]  [address] [place] [address]  Phone: [phone]  Fax: [phone]        Occupational Therapy Progress Note    Date: 3/31/2021      Patient: Rach Sanchez  : 1985  MRN: [id number]    Patient: Ramirez Valencia             : 1985                      MRN: [id number]  Referring Re Ramirez MD  Insurance: Other BCBS 180/180 life time amount   Medical Diagnosis: displaced fracture of proximal phalanx of R little finger, subsequent encounter for fracture with routine healing S62.616D            Rehab Codes: pain in hand M79.646,, stiffness in hand M25.64,, lack of coordination R27.8,, fine motor skills loss R29.818,, muscle weakness generalized M62.81, or adherent scar L90.5,  Onset Date: 21                Next Dr. Isai Raygoza  Visit# / total visits: ; Progress note for Medicare patient due at visit 9-10         Cancels/No Shows: 0/0  Date range of services: 3-8-21 to 3-31-21    Subjective:  Pain:  [x] Yes  [] No  Location: R PIP of R small digit Pain Rating: (0-10 scale)  3/10 Pain altered Tx:  [x] No  [] Yes  Action:  Comments: \"the pain could be my fault, I decided to try to clean a little\"    Objective: Tests/Measurements: Upper Extremity Functional Index  Current Functional Level:  67/80  functionally impaired as measured with the Upper Extremity Functional Index Survey. 0-80 scale, with 80 = no Deficits  (The UEFI model does not provide any specific cut off points that could classify the upper limb disability degree, however, a minimal detectable change of 9 points is provided. This means that for improvement or deterioration to be considered, between two subsequent evaluations, the scores must differ by at least 9 points.)     Sensibility: Normal    Edema: Min      Skin Color: Normal Skin/Scar condition Dry     Problems:      Pain, ROM, Strength, Function, Edema, Adherent Scar and Coordination      STRENGTH (Measured in pounds) 3-31-21                pounds RIGHT LEFT    61 inc 15 75   Lateral pinch 24 inc 4 23   2 point pinch 15 inc 2 14   3 jaw pinch 19 inc 1 20   The affected extremity is 19% weaker than the unaffected extremity. (affected score/unaffected score, take the total and subtract from 100)     DIGITS   Right Extension/Flexion 3-31-21  degrees LITTLE   MCP +15/65   PIP -20/65   DIP 0/18            R Small Digit               L small Digit  P1  7.1                         P1 6.5  P2 5.1                         P2 5.8     Assessment:  Patient would benefit from skilled occupational therapy services in order to: Improve  functional /grasp, Motor control/Tone in UE, ROM, Strength, Activity tolerance, Coordination deficit and Complaint of pain in order to Ensure safe return to work     Short Term Goals: (  12    Treatments)  1. Decrease Pain: to 4/10 with simple grooming tasks at home MET  2. Increase AROM (degrees) (extension/flexion)  a. R small digit MCP flexion to 30 for increased promotion for loose fist MET  b. R small digit PIP flexion to 30 for increased promotion for loose fist  MET  3. R small DIP flexion to 25 for increased promotion of loose fist ONGOING IMPROVING 18 degrees  4. Increase strength (pounds);  a. R  strength to 55 for increased I with home care tasks (open jars) MET  b. R Lateral pinch to 22 for increased I with heavy home tasks MET  c. R 2 point pinch to 15 for increased I with heavy home tasks MET  d. R 3 jaw pinch to 24 for increased I with heavy home tasks ONGOING IMPROVED 19 pounds  5. Increase function:UE Functional Index Score 54 or more points to promote increased functional abilities MET  6. Decrease Edema: of R small digit at P1 to 7.0 and P2 to 5.8  P2 MET  7.  Patient to be independent with home exercise program as demonstrated by

## 2021-04-05 ENCOUNTER — HOSPITAL ENCOUNTER (OUTPATIENT)
Dept: OCCUPATIONAL THERAPY | Age: 36
Setting detail: THERAPIES SERIES
Discharge: HOME | End: 2021-04-05
Payer: COMMERCIAL

## 2021-04-05 PROCEDURE — 97140 MANUAL THERAPY 1/> REGIONS: CPT

## 2021-04-05 PROCEDURE — 97110 THERAPEUTIC EXERCISES: CPT

## 2021-04-05 NOTE — FLOWSHEET NOTE
[x] Berto Ellis       Occupational Therapy            1st floor       [address], [place]         Phone: [phone]       Fax: [phone] [] [facility]  [address] [place] [address]  Phone: [phone]  Fax: [phone]      Occupational Therapy Daily Treatment Note    Date:  2021  Patient Name:  Priya De Dios    :  1985  MRN: [id number]  Patient: Priya Orf             : 1985                      MRN: [id number]  Referring Provider:  Arely Lozano MD  Insurance: Other BCBS 180/180 life time amount   Medical Diagnosis: displaced fracture of proximal phalanx of R little finger, subsequent encounter for fracture with routine healing S62.616D            Rehab Codes: pain in hand M79.646,, stiffness in hand M25.64,, lack of coordination R27.8,, fine motor skills loss R29.818,, muscle weakness generalized M62.81, or adherent scar L90.5,  Onset Date: 21                Next Dr. Leslie Taylor: 3-30-21  Visit# / total visits: 10/24; Progress note for Medicare patient due at visit 9-10   Cancels/No Shows: 0/0      Subjective:    Pain:  [x] Yes  [] No Location: dorsal side of R small digit MCP Pain Rating: (0-10 scale) 3/10 at rest  Pain altered Tx:  [x] No  [] Yes  Action:  Pt Comments:\"When I sat down I rolled my wrist a little bit \"    Objective:  Modalities:Modality Flow Sheet:  START STOP Tx Modality     Electrical Stim:     3-10-21  Ultrasound: _.8__ W/cm2 x __8_ mins  Duty factor: _x_100%  __50%  __33% __20%  Head size:    __2_ cm  MHz: __1mHz  _x_3mHz  Location: dorsal side of L      Hot Pack:     Cold Pack:     Precautions:  Exercises:  EXERCISE    REPS/     TIME  WEIGHT/    LEVEL COMMENTS   AROM 10  completed   PROM MCP, PIP, DIP 30 mins  completed   Towel Crunches 10  completed   Active Blocked Digits 10  Completed   Pinch pins with foam blocks 1 series Red 2 pounds  completed thumb, small and ring finger increased functional abilities  5. Decrease Edema: of R small digit at P1 to 7.0 and P2 to 5.8   6. Patient to be independent with home exercise program as demonstrated by performance with correct form without cues.     Long Term Goals: (  24  Treatments)  1. Scar will be soft and pliable with minimal tethering. 7. 2. Increase AROM (degrees) (extension/flexion)  a. R small digit MCP flexion to 75 for increased promotion for loose fist  b. R small digit PIP flexion to 65 for increased promotion for loose fist  c. R small DIP flexion to 50 for increased promotion of loose fist  8. Increase strength (pounds);  a. R  strength to 65 for increased I with home care tasks (open jars)  b. R Lateral pinch to 25 for increased I with heavy home tasks  c. R 2 point pinch to 17 for increased I with heavy home tasks              R 3 jaw pinch to 24 for increased I with heavy home tasks    Pt. Education:  [x] Yes  [] No  [x] Reviewed Prior HEP/Ed  Method of Education: [x] Verbal  [] Demo  [] Written  Re:  Comprehension of Education:  [] Verbalizes understanding. [x] Demonstrates understanding. [] Needs review. [x] Demonstrates/verbalizes HEP/Ed previously given. Plan: [x] Continue current frequency toward short and long term goals.   [] Specific Instructions for subsequent treatments:    [] Other:     Time In: 5969-5681     Electronically signed by:  KARINA May/TISHA

## 2021-04-06 ENCOUNTER — HOSPITAL ENCOUNTER (OUTPATIENT)
Dept: OCCUPATIONAL THERAPY | Age: 36
Setting detail: THERAPIES SERIES
Discharge: HOME | End: 2021-04-06
Payer: COMMERCIAL

## 2021-04-06 PROCEDURE — 97140 MANUAL THERAPY 1/> REGIONS: CPT

## 2021-04-06 PROCEDURE — 97110 THERAPEUTIC EXERCISES: CPT

## 2021-04-06 NOTE — FLOWSHEET NOTE
[x] Berto Ellis       Occupational Therapy            1st floor       [address], [place]         Phone: [phone]       Fax: [phone] [] [facility]  [address] [place] [address]  Phone: [phone]  Fax: [phone]      Occupational Therapy Daily Treatment Note    Date:  2021  Patient Name:  Harshad Marie    :  1985  MRN: [id number]  Patient: Harshad Marie             : 1985                      MRN: [id number]  Referring Provider:  Brandon Hickey MD  Insurance: Other BCBS 180/180 life time amount   Medical Diagnosis: displaced fracture of proximal phalanx of R little finger, subsequent encounter for fracture with routine healing S62.616D            Rehab Codes: pain in hand M79.646,, stiffness in hand M25.64,, lack of coordination R27.8,, fine motor skills loss R29.818,, muscle weakness generalized M62.81, or adherent scar L90.5,  Onset Date: 21                Next Dr. Liz Yang: 3-30-21  Visit# / total visits: ; Progress note for Medicare patient due at visit 9-10   Cancels/No Shows: 0/0      Subjective:    Pain:  [x] Yes  [] No Location: dorsal side of R small digit MCP Pain Rating: (0-10 scale) 3/10 at rest  Pain altered Tx:  [x] No  [] Yes  Action:  Pt Comments:\"I could not sleep last night, my mind would not shut down \"    Objective:  Modalities:Modality Flow Sheet:  START STOP Tx Modality     Electrical Stim:     3-10-21  Ultrasound: _.8__ W/cm2 x __8_ mins  Duty factor: _x_100%  __50%  __33% __20%  Head size:    __2_ cm  MHz: __1mHz  _x_3mHz  Location: dorsal side of L      Hot Pack:     Cold Pack:     Precautions:  Exercises:  EXERCISE    REPS/     TIME  WEIGHT/    LEVEL COMMENTS   AROM 10  completed   PROM MCP, PIP, DIP 30 mins  completed   Towel Crunches 10  completed   Active Blocked Digits 10  Completed   Pinch pins with foam blocks 1 series Red 2 pounds  completed thumb, small and ring finger tasks  4. Increase function:UE Functional Index Score 54 or more points to promote increased functional abilities  5. Decrease Edema: of R small digit at P1 to 7.0 and P2 to 5.8   6. Patient to be independent with home exercise program as demonstrated by performance with correct form without cues.     Long Term Goals: (  24  Treatments)  1. Scar will be soft and pliable with minimal tethering. 7. 2. Increase AROM (degrees) (extension/flexion)  a. R small digit MCP flexion to 75 for increased promotion for loose fist  b. R small digit PIP flexion to 65 for increased promotion for loose fist  c. R small DIP flexion to 50 for increased promotion of loose fist  8. Increase strength (pounds);  a. R  strength to 65 for increased I with home care tasks (open jars)  b. R Lateral pinch to 25 for increased I with heavy home tasks  c. R 2 point pinch to 17 for increased I with heavy home tasks              R 3 jaw pinch to 24 for increased I with heavy home tasks    Pt. Education:  [x] Yes  [] No  [x] Reviewed Prior HEP/Ed  Method of Education: [x] Verbal  [] Demo  [] Written  Re:  Comprehension of Education:  [] Verbalizes understanding. [x] Demonstrates understanding. [] Needs review. [x] Demonstrates/verbalizes HEP/Ed previously given. Plan: [x] Continue current frequency toward short and long term goals.   [] Specific Instructions for subsequent treatments: upgrade resistance as needed with exercises   [] Other:     Time In: 5999-8071     Electronically signed by:  KARINA Jones/TISHA

## 2021-04-09 ENCOUNTER — HOSPITAL ENCOUNTER (OUTPATIENT)
Dept: OCCUPATIONAL THERAPY | Age: 36
Setting detail: THERAPIES SERIES
Discharge: HOME | End: 2021-04-09
Payer: COMMERCIAL

## 2021-04-09 PROCEDURE — 97110 THERAPEUTIC EXERCISES: CPT

## 2021-04-09 PROCEDURE — 97035 APP MDLTY 1+ULTRASOUND EA 15: CPT

## 2021-04-09 NOTE — FLOWSHEET NOTE
[x] Berto Corral       Occupational Therapy            1st floor       [address] [place], [place]         Phone: [phone]       Fax: [phone] [] [facility]  [address] [place] [address]  Phone: [phone]  Fax: [phone]      Occupational Therapy Daily Treatment Note    Date:  2021  Patient Name:  Avinash Haider    :  1985  MRN: [id number]  Patient: Avinash Haider             : 1985                      MRN: [id number]  Referring Provider:  Yadira Theodore MD  Insurance: Other BCBS 180/180 life time amount   Medical Diagnosis: displaced fracture of proximal phalanx of R little finger, subsequent encounter for fracture with routine healing S62.616D            Rehab Codes: pain in hand M79.646,, stiffness in hand M25.64,, lack of coordination R27.8,, fine motor skills loss R29.818,, muscle weakness generalized M62.81, or adherent scar L90.5,  Onset Date: 21                Next Dr. Benedicto Stallings: 3-30-21  Visit# / total visits: ; Progress note for Medicare patient due at visit 9-10   Cancels/No Shows: 0/0      Subjective:    Pain:  [x] Yes  [] No Location: dorsal side of R small digit MCP Pain Rating: (0-10 scale) 3/10 at rest  Pain altered Tx:  [x] No  [] Yes  Action:  Pt Comments:\" \"    Objective:  Modalities:Modality Flow Sheet:  START STOP Tx Modality     Electrical Stim:     3-10-21  Ultrasound: _.8__ W/cm2 x __8_ mins  Duty factor: _x_100%  __50%  __33% __20%  Head size:    __2_ cm  MHz: __1mHz  _x_3mHz  Location: dorsal side of L      Hot Pack:     Cold Pack:     Precautions:  Exercises:  EXERCISE    REPS/     TIME  WEIGHT/    LEVEL COMMENTS   AROM 10  completed   PROM MCP, PIP, DIP 8 mins  completed   Towel Crunches 10  completed   Active Blocked Digits 10  Completed   Pinch pins with foam blocks 1 series Green 4  pounds  completed thumb, small and ring finger.  Upgraded this date   Theraputty   Strength  Finger flexion  Finger extension  Radial Dev  Ulnar Dev  Palmar Pinch  Lateral Pinch  Thumb ext  Three jaw brad  Finger thumb ext     orange 10    Finger platter for Active blocked digits    10 on each jt   Radial release marbles 1 series  completed   Flexbar   strength  Wrist Flexion/Ext  Wrist pronation  Wrist supination  Wrist ulnar deviation  Wrist radial dev  Shoulder Adb  Shoulder Add Green 10 pounds     added and upgraded this date     Other: Pt tolerated increased resistance with good follow through. Treatment Charges: Mins Units   []  Modalities:      [x]  Ultrasound 8 1   [x]  Ther Exercise 38 3   [x]  Manual Therapy 05 0   []  Ther Activities     []  Orthotic fit/train     []  Orthotic recheck     []  Other     Total Treatment time 54 4         Assessment: [x] Progressing toward goals. [] No change. [] Other     [x] Patient would benefit from skilled occupational therapy services in order to: Improve  functional /grasp, Motor control/Tone in UE, ROM, Strength, Activity tolerance, Coordination deficit and Complaint of pain in order to Ensure safe return to work     Short Term Goals: (  12    Treatments)  1. Decrease Pain: to 4/10 with simple grooming tasks at home  2. Increase AROM (degrees) (extension/flexion)  a. R small digit MCP flexion to 30 for increased promotion for loose fist  b. R small digit PIP flexion to 30 for increased promotion for loose fist  c. R small DIP flexion to 25 for increased promotion of loose fist  3. Increase strength (pounds);  a. R  strength to 55 for increased I with home care tasks (open jars)  b. R Lateral pinch to 22 for increased I with heavy home tasks  c. R 2 point pinch to 15 for increased I with heavy home tasks  d. R 3 jaw pinch to 24 for increased I with heavy home tasks  4. Increase function:UE Functional Index Score 54 or more points to promote increased functional abilities  5.  Decrease Edema: of R small digit at P1 to 7.0 and P2 to 5.8   6. Patient to be independent with home exercise program as demonstrated by performance with correct form without cues.     Long Term Goals: (  24  Treatments)  1. Scar will be soft and pliable with minimal tethering. 7. 2. Increase AROM (degrees) (extension/flexion)  a. R small digit MCP flexion to 75 for increased promotion for loose fist  b. R small digit PIP flexion to 65 for increased promotion for loose fist  c. R small DIP flexion to 50 for increased promotion of loose fist  8. Increase strength (pounds);  a. R  strength to 65 for increased I with home care tasks (open jars)  b. R Lateral pinch to 25 for increased I with heavy home tasks  c. R 2 point pinch to 17 for increased I with heavy home tasks              R 3 jaw pinch to 24 for increased I with heavy home tasks    Pt. Education:  [x] Yes  [] No  [x] Reviewed Prior HEP/Ed  Method of Education: [x] Verbal  [] Demo  [] Written  Re:  Comprehension of Education:  [] Verbalizes understanding. [x] Demonstrates understanding. [] Needs review. [x] Demonstrates/verbalizes HEP/Ed previously given. Plan: [x] Continue current frequency toward short and long term goals.   [] Specific Instructions for subsequent treatments: upgraded resistance as needed with exercises   [] Other:     Time In: 4605-2585     Electronically signed by:  KARINA Coughlin/TISHA

## 2021-04-12 ENCOUNTER — HOSPITAL ENCOUNTER (OUTPATIENT)
Dept: OCCUPATIONAL THERAPY | Age: 36
Setting detail: THERAPIES SERIES
Discharge: HOME | End: 2021-04-12
Payer: COMMERCIAL

## 2021-04-12 PROCEDURE — 97035 APP MDLTY 1+ULTRASOUND EA 15: CPT

## 2021-04-12 PROCEDURE — 97110 THERAPEUTIC EXERCISES: CPT

## 2021-04-12 NOTE — FLOWSHEET NOTE
Strength  Finger flexion  Finger extension  Radial Dev  Ulnar Dev  Palmar Pinch  Lateral Pinch  Thumb ext  Three jaw brad  Finger thumb ext     orange 10   not completed   Finger platter for Active blocked digits    10 on each jt   Radial release marbles 1 series  completed   Flexbar   strength  Wrist Flexion/Ext  Wrist pronation  Wrist supination  Wrist ulnar deviation  Wrist radial dev  Shoulder Adb  Shoulder Add Green 10 pounds     added and upgraded this date     Other: Pt tolerated increased resistance with good follow through and decreased pain this date. Treatment Charges: Mins Units   []  Modalities:      [x]  Ultrasound 8 1   [x]  Ther Exercise 40 3   []  Manual Therapy     []  Ther Activities     []  Orthotic fit/train     []  Orthotic recheck     []  Other     Total Treatment time 48 4     Assessment: [x] Progressing toward goals. [] No change. [] Other     [x] Patient would benefit from skilled occupational therapy services in order to: Improve  functional /grasp, Motor control/Tone in UE, ROM, Strength, Activity tolerance, Coordination deficit and Complaint of pain in order to Ensure safe return to work     Short Term Goals: (  12    Treatments)  1. Decrease Pain: to 4/10 with simple grooming tasks at home  2. Increase AROM (degrees) (extension/flexion)  a. R small digit MCP flexion to 30 for increased promotion for loose fist  b. R small digit PIP flexion to 30 for increased promotion for loose fist  c. R small DIP flexion to 25 for increased promotion of loose fist  3. Increase strength (pounds);  a. R  strength to 55 for increased I with home care tasks (open jars)  b. R Lateral pinch to 22 for increased I with heavy home tasks  c. R 2 point pinch to 15 for increased I with heavy home tasks  d. R 3 jaw pinch to 24 for increased I with heavy home tasks  4. Increase function:UE Functional Index Score 54 or more points to promote increased functional abilities  5.  Decrease Edema: of R small digit at P1 to 7.0 and P2 to 5.8   6. Patient to be independent with home exercise program as demonstrated by performance with correct form without cues.     Long Term Goals: (  24  Treatments)  1. Scar will be soft and pliable with minimal tethering. 7. 2. Increase AROM (degrees) (extension/flexion)  a. R small digit MCP flexion to 75 for increased promotion for loose fist  b. R small digit PIP flexion to 65 for increased promotion for loose fist  c. R small DIP flexion to 50 for increased promotion of loose fist  8. Increase strength (pounds);  a. R  strength to 65 for increased I with home care tasks (open jars)  b. R Lateral pinch to 25 for increased I with heavy home tasks  c. R 2 point pinch to 17 for increased I with heavy home tasks              R 3 jaw pinch to 24 for increased I with heavy home tasks    Pt. Education:  [x] Yes  [] No  [x] Reviewed Prior HEP/Ed  Method of Education: [x] Verbal  [] Demo  [] Written  Re:  Comprehension of Education:  [] Verbalizes understanding. [x] Demonstrates understanding. [] Needs review. [x] Demonstrates/verbalizes HEP/Ed previously given. Plan: [x] Continue current frequency toward short and long term goals.   [] Specific Instructions for subsequent treatments: upgraded resistance as needed with exercises   [] Other:     Time In: 7254-8351     Electronically signed by:  KARINA Sanches/L

## 2021-04-14 ENCOUNTER — HOSPITAL ENCOUNTER (OUTPATIENT)
Dept: OCCUPATIONAL THERAPY | Age: 36
Setting detail: THERAPIES SERIES
Discharge: HOME | End: 2021-04-14
Payer: COMMERCIAL

## 2021-04-14 PROCEDURE — 97110 THERAPEUTIC EXERCISES: CPT

## 2021-04-14 PROCEDURE — 97035 APP MDLTY 1+ULTRASOUND EA 15: CPT

## 2021-04-14 NOTE — FLOWSHEET NOTE
[x] B[facility] Corral       Occupational Therapy            1st floor       [address] [place], [place]         Phone: [phone]       Fax: [phone] [] [facility]  [address] [place] [address]  Phone: [phone]  Fax: [phone]      Occupational Therapy Daily Treatment Note    Date:  2021  Patient Name:  Esequiel Ulloa    :  1985  MRN: [id number]  Patient: Esequiel Ulloa             : 1985                      MRN: [id number]  Referring Provider:  Ann Campos MD  Insurance: Other BCBS 180/180 life time amount   Medical Diagnosis: displaced fracture of proximal phalanx of R little finger, subsequent encounter for fracture with routine healing S62.616D            Rehab Codes: pain in hand M79.646,, stiffness in hand M25.64,, lack of coordination R27.8,, fine motor skills loss R29.818,, muscle weakness generalized M62.81, or adherent scar L90.5,  Onset Date: 21                Next Dr. Radha Mendoza: 3-30-21  Visit# / total visits: ; Progress note for Medicare patient due at visit 9-10   Cancels/No Shows: 0/0      Subjective:    Pain:  [x] Yes  [] No Location: dorsal side of R small digit MCP Pain Rating: (0-10 scale) 3/10 at rest  Pain altered Tx:  [x] No  [] Yes  Action:  Pt Comments:\" not much is new\"    Objective:  Modalities:Modality Flow Sheet:  START STOP Tx Modality     Electrical Stim:     3-10-21  Ultrasound: _.8__ W/cm2 x __8_ mins  Duty factor: _x_100%  __50%  __33% __20%  Head size:    __2_ cm  MHz: __1mHz  _x_3mHz  Location: dorsal side of L      Hot Pack:     Cold Pack:     Precautions: NA  Exercises:  EXERCISE    REPS/     TIME  WEIGHT/    LEVEL COMMENTS   AROM 10  completed   PROM MCP, PIP, DIP 8 mins  completed   Towel Crunches 10  completed   Active Blocked Digits 10  Completed   Pinch pins with foam blocks 1 series Green 4  pounds  completed thumb, small and ring finger.     Theraputty   Strength  Finger flexion  Finger extension  Radial Dev  Ulnar Dev  Palmar Pinch  Lateral Pinch  Thumb ext  Three jaw brad  Finger thumb ext     orange 10   completed   Finger platter for Active blocked digits    10 on each jt   Radial release marbles 1 series  completed   Flexbar   strength  Wrist Flexion/Ext  Wrist pronation  Wrist supination  Wrist ulnar deviation  Wrist radial dev  Shoulder Adb  Shoulder Add Green 10 pounds     Completed. Other: Pt tolerated increased resistance with good follow through and decreased pain this date. Pt was treated with another pt. Time split between     Treatment Charges: Mins Units   []  Modalities:      [x]  Ultrasound 8 1   [x]  Ther Exercise 40 1   []  Manual Therapy     []  Ther Activities     []  Orthotic fit/train     []  Orthotic recheck     []  Other     Total Treatment time 48 2     Assessment: [x] Progressing toward goals. [] No change. [] Other     [x] Patient would benefit from skilled occupational therapy services in order to: Improve  functional /grasp, Motor control/Tone in UE, ROM, Strength, Activity tolerance, Coordination deficit and Complaint of pain in order to Ensure safe return to work     Short Term Goals: (  12    Treatments)  1. Decrease Pain: to 4/10 with simple grooming tasks at home  2. Increase AROM (degrees) (extension/flexion)  a. R small digit MCP flexion to 30 for increased promotion for loose fist  b. R small digit PIP flexion to 30 for increased promotion for loose fist  c. R small DIP flexion to 25 for increased promotion of loose fist  3. Increase strength (pounds);  a. R  strength to 55 for increased I with home care tasks (open jars)  b. R Lateral pinch to 22 for increased I with heavy home tasks  c. R 2 point pinch to 15 for increased I with heavy home tasks  d. R 3 jaw pinch to 24 for increased I with heavy home tasks  4.  Increase function:UE Functional Index Score 54 or more points to promote increased functional abilities  5. Decrease Edema: of R small digit at P1 to 7.0 and P2 to 5.8   6. Patient to be independent with home exercise program as demonstrated by performance with correct form without cues.     Long Term Goals: (  24  Treatments)  1. Scar will be soft and pliable with minimal tethering. 7. 2. Increase AROM (degrees) (extension/flexion)  a. R small digit MCP flexion to 75 for increased promotion for loose fist  b. R small digit PIP flexion to 65 for increased promotion for loose fist  c. R small DIP flexion to 50 for increased promotion of loose fist  8. Increase strength (pounds);  a. R  strength to 65 for increased I with home care tasks (open jars)  b. R Lateral pinch to 25 for increased I with heavy home tasks  c. R 2 point pinch to 17 for increased I with heavy home tasks              R 3 jaw pinch to 24 for increased I with heavy home tasks    Pt. Education:  [x] Yes  [] No  [x] Reviewed Prior HEP/Ed  Method of Education: [x] Verbal  [] Demo  [] Written  Re:  Comprehension of Education:  [] Verbalizes understanding. [x] Demonstrates understanding. [] Needs review. [x] Demonstrates/verbalizes HEP/Ed previously given. Plan: [x] Continue current frequency toward short and long term goals.   [] Specific Instructions for subsequent treatments: upgraded resistance as needed with exercises   [] Other:     Time In: 0982-4194     Electronically signed by:  Natha Simmonds, OTR/TISHA

## 2021-04-16 ENCOUNTER — HOSPITAL ENCOUNTER (OUTPATIENT)
Dept: OCCUPATIONAL THERAPY | Age: 36
Setting detail: THERAPIES SERIES
Discharge: HOME | End: 2021-04-16
Payer: COMMERCIAL

## 2021-04-16 PROCEDURE — 97035 APP MDLTY 1+ULTRASOUND EA 15: CPT

## 2021-04-16 PROCEDURE — 97110 THERAPEUTIC EXERCISES: CPT

## 2021-04-16 NOTE — FLOWSHEET NOTE
[x] [facility]       Occupational Therapy            1st floor       [address], [place]         Phone: [phone]       Fax: [phone] [] [facility]  [address] [place] [address]  Phone: [phone]  Fax: [phone]      Occupational Therapy Daily Treatment Note    Date:  2021  Patient Name:  Lyla Fermin    :  1985  MRN: [id number]  Patient: Lyla Fermin             : 1985                      MRN: [id number]  Referring Provider:  Castro Ruiz MD  Insurance: Other BCBS 180/180 life time amount   Medical Diagnosis: displaced fracture of proximal phalanx of R little finger, subsequent encounter for fracture with routine healing S62.616D            Rehab Codes: pain in hand M79.646,, stiffness in hand M25.64,, lack of coordination R27.8,, fine motor skills loss R29.818,, muscle weakness generalized M62.81, or adherent scar L90.5,  Onset Date: 21                Next Dr. Mei Dailey: 3-30-21  Visit# / total visits: 15/24; Progress note for Medicare patient due at visit 9-10   Cancels/No Shows: 0/0      Subjective:    Pain:  [x] Yes  [] No Location: dorsal side of R small digit MCP Pain Rating: (0-10 scale) 2/10 at rest  Pain altered Tx:  [x] No  [] Yes  Action:  Pt Comments:\"it is the same as it was on wed\"    Objective:  Modalities:Modality Flow Sheet:  START STOP Tx Modality     Electrical Stim:     3-10-21  Ultrasound: _.8__ W/cm2 x __8_ mins  Duty factor: _x_100%  __50%  __33% __20%  Head size:    __2_ cm  MHz: __1mHz  _x_3mHz  Location: dorsal side of L      Hot Pack:     Cold Pack:     Precautions: NA  Exercises:  EXERCISE    REPS/     TIME  WEIGHT/    LEVEL COMMENTS   AROM 10  completed   PROM MCP, PIP, DIP 8 mins  completed   Towel Crunches 10  completed   Active Blocked Digits 10  Completed   Pinch pins with foam blocks 1 series Green 4  pounds  completed thumb, small and ring finger.     Theraputty   Strength  Finger flexion  Finger extension  Radial Dev  Ulnar Dev  Palmar Pinch  Lateral Pinch  Thumb ext   Three jaw brad  Finger thumb ext     orange 10   completed   Finger platter for Active blocked digits    10 on each jt   Radial release marbles 1 series  completed   Flexbar   strength  Wrist Flexion/Ext  Wrist pronation  Wrist supination  Wrist ulnar deviation  Wrist radial dev  Shoulder Adb  Shoulder Add Green 15 pounds     Completed. Other: Pt reports having made progress in small digit, however demo difficulty with DIP flexion. Treatment Charges: Mins Units   []  Modalities:      [x]  Ultrasound 8 1   [x]  Ther Exercise 40 1   []  Manual Therapy     []  Ther Activities     []  Orthotic fit/train     []  Orthotic recheck     []  Other     Total Treatment time 48 2     Assessment: [x] Progressing toward goals. [] No change. [] Other     [x] Patient would benefit from skilled occupational therapy services in order to: Improve  functional /grasp, Motor control/Tone in UE, ROM, Strength, Activity tolerance, Coordination deficit and Complaint of pain in order to Ensure safe return to work     Short Term Goals: (  12    Treatments)  1. Decrease Pain: to 4/10 with simple grooming tasks at home  2. Increase AROM (degrees) (extension/flexion)  a. R small digit MCP flexion to 30 for increased promotion for loose fist  b. R small digit PIP flexion to 30 for increased promotion for loose fist  c. R small DIP flexion to 25 for increased promotion of loose fist  3. Increase strength (pounds);  a. R  strength to 55 for increased I with home care tasks (open jars)  b. R Lateral pinch to 22 for increased I with heavy home tasks  c. R 2 point pinch to 15 for increased I with heavy home tasks  d. R 3 jaw pinch to 24 for increased I with heavy home tasks  4. Increase function:UE Functional Index Score 54 or more points to promote increased functional abilities  5.  Decrease Edema: of R small digit at P1 to 7.0 and P2 to 5.8   6. Patient to be independent with home exercise program as demonstrated by performance with correct form without cues.     Long Term Goals: (  24  Treatments)  1. Scar will be soft and pliable with minimal tethering. 7. 2. Increase AROM (degrees) (extension/flexion)  a. R small digit MCP flexion to 75 for increased promotion for loose fist  b. R small digit PIP flexion to 65 for increased promotion for loose fist  c. R small DIP flexion to 50 for increased promotion of loose fist  8. Increase strength (pounds);  a. R  strength to 65 for increased I with home care tasks (open jars)  b. R Lateral pinch to 25 for increased I with heavy home tasks  c. R 2 point pinch to 17 for increased I with heavy home tasks              R 3 jaw pinch to 24 for increased I with heavy home tasks    Pt. Education:  [x] Yes  [] No  [x] Reviewed Prior HEP/Ed  Method of Education: [x] Verbal  [] Demo  [] Written  Re:  Comprehension of Education:  [] Verbalizes understanding. [x] Demonstrates understanding. [] Needs review. [x] Demonstrates/verbalizes HEP/Ed previously given. Plan: [x] Continue current frequency toward short and long term goals.   [] Specific Instructions for subsequent treatments: upgraded resistance as needed with exercises   [] Other:     Time In: 3840-0142     Electronically signed by:  KARINA Pacheco/TISHA

## 2021-04-26 ENCOUNTER — HOSPITAL ENCOUNTER (OUTPATIENT)
Dept: OCCUPATIONAL THERAPY | Age: 36
Setting detail: THERAPIES SERIES
Discharge: HOME | End: 2021-04-26
Payer: COMMERCIAL

## 2021-04-26 PROCEDURE — 97110 THERAPEUTIC EXERCISES: CPT

## 2021-04-26 PROCEDURE — 97033 APP MDLTY 1+IONTPHRSIS EA 15: CPT

## 2021-04-26 NOTE — FLOWSHEET NOTE
Strength  Finger flexion  Finger extension  Radial Dev  Ulnar Dev  Palmar Pinch  Lateral Pinch  Thumb ext   Three jaw brad  Finger thumb ext     orange 10   completed   Finger platter for Active blocked digits    10 on each jt   Radial release marbles 1 series  completed   Flexbar   strength  Wrist Flexion/Ext  Wrist pronation  Wrist supination  Wrist ulnar deviation  Wrist radial dev  Shoulder Adb  Shoulder Add Green 15 pounds     Completed. Other: Pt reports having made progress in small digit, however continues to demo difficulty with DIP flexion. Pt small digit swelling at P1 and P2 have decreased and has met STG. Co treatment this date. Treatment Charges: Mins Units   []  Modalities:      [x]  Ultrasound 0 0   [x]  Ther Exercise 40 1   []  Manual Therapy     []  Ther Activities     []  Orthotic fit/train     []  Orthotic recheck     [x]  Other IONTO 20 1   Total Treatment time 48 2     Assessment: [x] Progressing toward goals. [] No change. [] Other     [x] Patient would benefit from skilled occupational therapy services in order to: Improve  functional /grasp, Motor control/Tone in UE, ROM, Strength, Activity tolerance, Coordination deficit and Complaint of pain in order to Ensure safe return to work     Short Term Goals: (  12    Treatments)  1. Decrease Pain: to 4/10 with simple grooming tasks at home  2. Increase AROM (degrees) (extension/flexion)  a. R small digit MCP flexion to 30 for increased promotion for loose fist  b. R small digit PIP flexion to 30 for increased promotion for loose fist  c. R small DIP flexion to 25 for increased promotion of loose fist  3. Increase strength (pounds);  a. R  strength to 55 for increased I with home care tasks (open jars)  b. R Lateral pinch to 22 for increased I with heavy home tasks  c. R 2 point pinch to 15 for increased I with heavy home tasks  d. R 3 jaw pinch to 24 for increased I with heavy home tasks  4.  Increase function:UE Functional Index Score 54 or more points to promote increased functional abilities  5. Decrease Edema: of R small digit at P1 to 7.0 and P2 to 5.8 MET 4-26  6. Patient to be independent with home exercise program as demonstrated by performance with correct form without cues.     Long Term Goals: (  24  Treatments)  1. Scar will be soft and pliable with minimal tethering. 7. 2. Increase AROM (degrees) (extension/flexion)  a. R small digit MCP flexion to 75 for increased promotion for loose fist  b. R small digit PIP flexion to 65 for increased promotion for loose fist  c. R small DIP flexion to 50 for increased promotion of loose fist  8. Increase strength (pounds);  a. R  strength to 65 for increased I with home care tasks (open jars)  b. R Lateral pinch to 25 for increased I with heavy home tasks  c. R 2 point pinch to 17 for increased I with heavy home tasks              R 3 jaw pinch to 24 for increased I with heavy home tasks    Pt. Education:  [x] Yes  [] No  [x] Reviewed Prior HEP/Ed  Method of Education: [x] Verbal  [] Demo  [] Written  Re:  Comprehension of Education:  [] Verbalizes understanding. [x] Demonstrates understanding. [] Needs review. [x] Demonstrates/verbalizes HEP/Ed previously given. Plan: [x] Continue current frequency toward short and long term goals.   [] Specific Instructions for subsequent treatments: upgrade resistance as needed with exercises   [] Other:     Time In: 0590-2816     Electronically signed by:  KARINA Looney/TISHA

## 2021-04-28 ENCOUNTER — HOSPITAL ENCOUNTER (OUTPATIENT)
Dept: OCCUPATIONAL THERAPY | Age: 36
Setting detail: THERAPIES SERIES
Discharge: HOME | End: 2021-04-28
Payer: COMMERCIAL

## 2021-04-28 PROCEDURE — 97110 THERAPEUTIC EXERCISES: CPT

## 2021-04-28 PROCEDURE — 97035 APP MDLTY 1+ULTRASOUND EA 15: CPT

## 2021-04-30 ENCOUNTER — HOSPITAL ENCOUNTER (OUTPATIENT)
Dept: OCCUPATIONAL THERAPY | Age: 36
Setting detail: THERAPIES SERIES
Discharge: HOME | End: 2021-04-30
Payer: COMMERCIAL

## 2021-04-30 PROCEDURE — 97110 THERAPEUTIC EXERCISES: CPT

## 2021-04-30 NOTE — FLOWSHEET NOTE
[x] [facility]       Occupational Therapy            1st floor       [address], [place]         Phone: [phone]       Fax: [phone] [] [facility]  [address], [place]  Phone: [phone]  Fax: [phone]      Occupational Therapy Daily Treatment Note    Date:  2021  Patient Name:  Janak Barreto    :  1985  MRN: [id number]  Patient: Janak Barreto             : 1985                      MRN: [id number]  Referring Provider:  Walter Medrano MD  Insurance: Other BCBS 180/180 life time amount   Medical Diagnosis: displaced fracture of proximal phalanx of R little finger, subsequent encounter for fracture with routine healing S62.616D            Rehab Codes: pain in hand M79.646,, stiffness in hand M25.64,, lack of coordination R27.8,, fine motor skills loss R29.818,, muscle weakness generalized M62.81, or adherent scar L90.5,  Onset Date: 21                Next Dr. Chow Shadow: 21  Visit# / total visits: ; Progress note for Medicare patient due at visit 19   Cancels/No Shows: 0/0      Subjective:    Pain:  [x] Yes  [] No Location: dorsal side of R small digit MCP Pain Rating: (0-10 scale) 2/10 at rest  Pain altered Tx:  [x] No  [] Yes  Action: none   Pt Comments: \"the swelling has really gone down\"    Objective:  Modalities:Modality Flow Sheet:  START STOP Tx Modality     Electrical Stim:     3-10-21  Ultrasound: _.8__ W/cm2 x __8_ mins  Duty factor: _x_100%  __50%  __33% __20%  Head size:    __2_ cm  MHz: __1mHz  _x_3mHz  Location: dorsal side of L little finger      Hot Pack:     Cold Pack:     Precautions: NA  Exercises:  EXERCISE    REPS/     TIME  WEIGHT/    LEVEL COMMENTS   AROM 10  Not completed   PROM MCP, PIP, DIP 8 mins  completed   Towel Crunches 10  completed   Active Blocked Digits 10  Not Completed   Pinch pins with foam blocks 1 series Green 4  pounds  Completed thumb, small and ring finger. Theraputty   Strength  Finger flexion  Finger extension  Radial Dev  Ulnar Dev  Palmar Pinch  Lateral Pinch  Thumb ext   Three jaw brad  Finger thumb ext     orange 10   NOT completed   Finger platter for Active blocked digits 10 reps   Completed    Radial release marbles 1 series  completed   Flexbar   strength  Wrist Flexion/Ext  Wrist pronation  Wrist supination  Wrist ulnar deviation  Wrist radial dev  Shoulder Adb  Shoulder Add 10 reps   Green    Completed. Plan to upgrade   Wrist roll  3 pounds  2 series, added this date     Other: Pt has full ROM OF MCP and PIP of R small digit. Pt DIP pad to palm. Treatment Charges: Mins Units   []  Modalities:      []  Ultrasound     [x]  Ther Exercise 55 4   []  Manual Therapy     []  Ther Activities     []  Orthotic fit/train     []  Orthotic recheck     [x]  Other      Total Treatment time 55 4     Assessment: [x] Progressing toward goals. Min difficulty with completing pinch pin task using small and ring fingers and thumb. Tolerated all other exercises without complaints. Good tolerance of PROM to R 5th digit. [] No change. [] Other     [x] Patient would benefit from skilled occupational therapy services in order to: Improve  functional /grasp, Motor control/Tone in UE, ROM, Strength, Activity tolerance, Coordination deficit and Complaint of pain in order to Ensure safe return to work     Short Term Goals: (  12 Treatments)  1. Decrease Pain: to 4/10 with simple grooming tasks at home  2. Increase AROM (degrees) (extension/flexion)  a. R small digit MCP flexion to 30 for increased promotion for loose fist  b. R small digit PIP flexion to 30 for increased promotion for loose fist  c. R small DIP flexion to 25 for increased promotion of loose fist  3.  Increase strength (pounds);  a. R  strength to 55 for increased I with home care tasks (open jars)  b. R Lateral pinch to 22 for increased I with heavy home tasks  c. R 2 point pinch to 15 for increased I with heavy home tasks  d. R 3 jaw pinch to 24 for increased I with heavy home tasks  4. Increase function:UE Functional Index Score 54 or more points to promote increased functional abilities  5. Decrease Edema: of R small digit at P1 to 7.0 and P2 to 5.8 MET 4-26  6. Patient to be independent with home exercise program as demonstrated by performance with correct form without cues.     Long Term Goals: (  24  Treatments)  1. Scar will be soft and pliable with minimal tethering. 7. 2. Increase AROM (degrees) (extension/flexion)  a. R small digit MCP flexion to 75 for increased promotion for loose fist  b. R small digit PIP flexion to 65 for increased promotion for loose fist  c. R small DIP flexion to 50 for increased promotion of loose fist  8. Increase strength (pounds);  a. R  strength to 65 for increased I with home care tasks (open jars)  b. R Lateral pinch to 25 for increased I with heavy home tasks  c. R 2 point pinch to 17 for increased I with heavy home tasks              R 3 jaw pinch to 24 for increased I with heavy home tasks    Pt. Education:  [] Yes  [x] No  [] Reviewed Prior HEP/Ed  Method of Education: [] Verbal  [] Demo  [] Written  Re:  Comprehension of Education:  [] Verbalizes understanding. [x] Demonstrates understanding. [] Needs review. [] Demonstrates/verbalizes HEP/Ed previously given. Plan: [x] Continue current frequency toward short and long term goals.   [] Specific Instructions for subsequent treatments: upgrade resistance as needed with exercises   [] Other:     Time In: 9921-1641     Electronically signed by:  KARINA Nolasco/TISHA

## 2021-05-03 ENCOUNTER — HOSPITAL ENCOUNTER (OUTPATIENT)
Dept: OCCUPATIONAL THERAPY | Age: 36
Setting detail: THERAPIES SERIES
Discharge: HOME | End: 2021-05-03
Payer: COMMERCIAL

## 2021-05-03 PROCEDURE — 97110 THERAPEUTIC EXERCISES: CPT

## 2021-05-03 NOTE — PROGRESS NOTES
[x] [facility]  [address]  [phone]  F: [phone] [] [facility]  [address] [place] [address]  Phone: [phone]  Fax: [phone]        Occupational Therapy Progress Note    Date: 5/3/2021      Patient: Avni Tirado  : 1985  MRN: [id number]    Referring Nidia Giron MD  Insurance: Other BCBS 180/180 life time amount   Medical Diagnosis: displaced fracture of proximal phalanx of R little finger, subsequent encounter for fracture with routine healing S62.616D            Rehab Codes: pain in hand M79.646,, stiffness in hand M25.64,, lack of coordination R27.8,, fine motor skills loss R29.818,, muscle weakness generalized M62.81, or adherent scar L90.5,  Onset Date: 21                Next Dr. Joyce Scheuermann  Visit# / total visits: ; Progress note for Medicare patient completed on visit 19          Date range of services:  3-8-21 to 5-3-21    Subjective:  Pain:  [] Yes  [x] No  Location:N/A Pain Rating: (0-10 scale) 0/10 Pain altered Tx:  [x] No  [] Yes  Action:  Comments: \"I do not have any pain today. \"    Objective:  STRENGTH (Measured in pounds)              pounds RIGHT LEFT    96 inc 35 75   Lateral pinch 22 dec 4 23   2 point pinch 15 same 14   3 jaw pinch 15 dec 4 20   The affected extremity is 0% weaker than the unaffected extremity.   (affected score/unaffected score, take the total and subtract from 100)     DIGITS   Right Extension/Flexion  degrees LITTLE   MCP +20/75   PIP -15/70   DIP 0/26            R Small Digit               L small Digit  P1  7.1   6.1                      P1 6.5  5.5  P2 5.1                         P2 5.8  Objective: Tests/Measurements: Upper Extremity Functional Index  Current Functional Level:  72/80  functionally impaired as measured with the Upper Extremity Functional Index Survey.  0-80 scale, with 80 = no Deficits(The UEFI model does not provide any specific cut off points that could classify the upper limb disability degree, however, a minimal detectable change of 9 points is provided. This means that for improvement or deterioration to be considered, between two subsequent evaluat    Assessment:  Short Term Goals: (  12    Treatments)  1. Decrease Pain: to 4/10 with simple grooming tasks at home MET  2. Increase AROM (degrees) (extension/flexion)  a. R small digit MCP flexion to 30 for increased promotion for loose fist MET  b. R small digit PIP flexion to 30 for increased promotion for loose fist MET  c. R small DIP flexion to 25 for increased promotion of loose fist MET  3. Increase strength (pounds);  a. R  strength to 55 for increased I with home care tasks (open jars) MET  b. R Lateral pinch to 22 for increased I with heavy home tasks MET  c. R 2 point pinch to 15 for increased I with heavy home tasks MET  d. R 3 jaw pinch to 24 for increased I with heavy home tasks ONGOING  4. Increase function:UE Functional Index Score 54 or more points to promote increased functional abilities MET  5. Decrease Edema: of R small digit at P1 to 7.0 and P2 to 5.8 MET  6. Patient to be independent with home exercise program as demonstrated by performance with correct form without cues. MET     Long Term Goals: (  24  Treatments)  1. Scar will be soft and pliable with minimal tethering. 7. 2. Increase AROM (degrees) (extension/flexion)  a. R small digit MCP flexion to 75 for increased promotion for loose fist  b. R small digit PIP flexion to 65 for increased promotion for loose fist MET  c. R small DIP flexion to 50 for increased promotion of loose fist   8.  Increase strength (pounds);  a. R  strength to 65 for increased I with home care tasks (open jars) MET  b. R Lateral pinch to 25 for increased I with heavy home tasks  c. R 2 point pinch to 17 for increased I with heavy home tasks              R 3 jaw pinch to 24 for increased I with heavy home tasks      Treatment Plan:   Therapeutic Ex 79408, Manual 30387, HEP, Cold/Hot Pack, Ultrasound [id number] and Iontophoresis (4mg/mL dexamethasone sodium phosphate 40-120mA min) 16076    Patient Status: (requested frequency/duration)     [x] Continue per initial/current plan of care 3 times per week for 13remaining visits. [] Additional visits necessary. Electronically signed by TRISTEN Torres on 5/3/2021 at 2:00 PM      If you have any questions or concerns, please don't hesitate to call. Thank you for your referral.    Physician Signature:________________________________Date:__________________  By signing above or cosigning this note, I have reviewed this plan of care and certify a need for medically necessary rehabilitation services.      *PLEASE SIGN ABOVE AND FAX BACK ALL PAGES*

## 2021-05-03 NOTE — FLOWSHEET NOTE
[x] Berto Corral       Occupational Therapy            1st floor       [address] [facility]         Phone: [phone]       Fax: [phone] [] [facility]  [address] [facility]  Phone: [phone]  Fax: [phone]      Occupational Therapy Daily Treatment Note    Date:  5/3/2021  Patient Name:  Naren Coleman    :  1985  MRN: [id number]  Patient: Naren Coleman             : 1985                      MRN: [id number]  Referring Provider:  Franky Thacker MD  Insurance: Other BCBS 180/180 life time amount   Medical Diagnosis: displaced fracture of proximal phalanx of R little finger, subsequent encounter for fracture with routine healing S62.616D            Rehab Codes: pain in hand M79.646,, stiffness in hand M25.64,, lack of coordination R27.8,, fine motor skills loss R29.818,, muscle weakness generalized M62.81, or adherent scar L90.5,  Onset Date: 21                Next Dr. Delilah Santos: 21  Visit# / total visits: ; Progress note for Medicare patient due at visit 19   Cancels/No Shows: 0/0      Subjective:    Pain:  [x] Yes  [] No Location: dorsal side of R small digit MCP Pain Rating: (0-10 scale) 0/10 at rest  Pain altered Tx:  [x] No  [] Yes  Action: none   Pt Comments: \"I dont have any pain today\"    Objective:  Modalities:Modality Flow Sheet:  START STOP Tx Modality     Electrical Stim:     3-10-21  Ultrasound: _.8__ W/cm2 x __8_ mins  Duty factor: _x_100%  __50%  __33% __20%  Head size:    __2_ cm  MHz: __1mHz  _x_3mHz  Location: dorsal side of L little finger      Hot Pack:     Cold Pack:     Precautions: NA  Exercises:  EXERCISE    REPS/     TIME  WEIGHT/    LEVEL COMMENTS   AROM 10  Not completed   PROM MCP, PIP, DIP 8 mins  completed   Towel Crunches 10  completed   Active Blocked Digits 10  Not Completed   Pinch pins with foam blocks 1 series Green 4  pounds  Completed thumb, small and ring finger. Theraputty   Strength  Finger flexion  Finger extension  Radial Dev  Ulnar Dev  Palmar Pinch  Lateral Pinch  Thumb ext   Three jaw rbad  Finger thumb ext     orange 10   NOT completed   Finger platter for Active blocked digits 10 reps   Completed    Radial release marbles 1 series  Not completed   Flexbar   strength  Wrist Flexion/Ext  Wrist pronation  Wrist supination  Wrist ulnar deviation  Wrist radial dev  Shoulder Adb  Shoulder Add 10 reps   Green    Completed. Wrist roll  3 pounds  2 series     Other: Pt has full ROM OF MCP and PIP of R small digit. Pt DIP pad to palm. Pt has had large improvement on  strength and continues to make ROM gains. Treatment Charges: Mins Units   []  Modalities:      []  Ultrasound     [x]  Ther Exercise 55 4   []  Manual Therapy     []  Ther Activities     []  Orthotic fit/train     []  Orthotic recheck     [x]  Other      Total Treatment time 55 4     Assessment: [x] Progressing toward goals. Min difficulty with completing pinch pin task using small and ring fingers and thumb. Tolerated all other exercises without complaints. Good tolerance of PROM to R 5th digit. [] No change. [] Other     [x] Patient would benefit from skilled occupational therapy services in order to: Improve  functional /grasp, Motor control/Tone in UE, ROM, Strength, Activity tolerance, Coordination deficit and Complaint of pain in order to Ensure safe return to work     Short Term Goals: (  12 Treatments)  Short Term Goals: (  12    Treatments)  1. Decrease Pain: to 4/10 with simple grooming tasks at home MET  2. Increase AROM (degrees) (extension/flexion)  a. R small digit MCP flexion to 30 for increased promotion for loose fist MET  b. R small digit PIP flexion to 30 for increased promotion for loose fist MET  c. R small DIP flexion to 25 for increased promotion of loose fist MET  3.  Increase strength (pounds);  a. R  strength to 55 for increased I with home care tasks (open jars) MET  b. R Lateral pinch to 22 for increased I with heavy home tasks MET  c. R 2 point pinch to 15 for increased I with heavy home tasks MET  d. R 3 jaw pinch to 24 for increased I with heavy home tasks ONGOING  4. Increase function:UE Functional Index Score 54 or more points to promote increased functional abilities MET  5. Decrease Edema: of R small digit at P1 to 7.0 and P2 to 5.8 MET  6. Patient to be independent with home exercise program as demonstrated by performance with correct form without cues. MET     Long Term Goals: (  24  Treatments)  1. Scar will be soft and pliable with minimal tethering. 7. 2. Increase AROM (degrees) (extension/flexion)  a. R small digit MCP flexion to 75 for increased promotion for loose fist  b. R small digit PIP flexion to 65 for increased promotion for loose fist MET  c. R small DIP flexion to 50 for increased promotion of loose fist   8. Increase strength (pounds);  a. R  strength to 65 for increased I with home care tasks (open jars) MET  b. R Lateral pinch to 25 for increased I with heavy home tasks  c. R 2 point pinch to 17 for increased I with heavy home tasks              R 3 jaw pinch to 24 for increased I with   Pt. Education:  [] Yes  [x] No  [] Reviewed Prior HEP/Ed  Method of Education: [x] Verbal  [] Demo  [] Written  Re:  Comprehension of Education:  [] Verbalizes understanding. [x] Demonstrates understanding. [] Needs review. [] Demonstrates/verbalizes HEP/Ed previously given. Plan: [x] Continue current frequency toward short and long term goals.   [] Specific Instructions for subsequent treatments: upgrade resistance as needed with exercises   [] Other:     Time In: 3672-7718     Electronically signed by:  Carloz Hobbs OTR/TISHA

## 2021-05-05 ENCOUNTER — HOSPITAL ENCOUNTER (OUTPATIENT)
Dept: OCCUPATIONAL THERAPY | Age: 36
Setting detail: THERAPIES SERIES
Discharge: HOME | End: 2021-05-05
Payer: COMMERCIAL

## 2021-05-05 PROCEDURE — 97110 THERAPEUTIC EXERCISES: CPT

## 2021-05-05 NOTE — FLOWSHEET NOTE
[x] Berto Corral       Occupational Therapy            1st floor       [address] [place], [place]         Phone: [phone]       Fax: [phone] [] [facility]  [address] [place] [address]  Phone: [phone]  Fax: [phone]      Occupational Therapy Daily Treatment Note    Date:  2021  Patient Name:  Ricardo Jackson    :  1985  MRN: [id number]  Patient: Ricardo Jackson             : 1985                      MRN: [id number]  Referring Provider:  Gordon Dooley MD  Insurance: Other BCBS 180/180 life time amount   Medical Diagnosis: displaced fracture of proximal phalanx of R little finger, subsequent encounter for fracture with routine healing S62.616D            Rehab Codes: pain in hand M79.646,, stiffness in hand M25.64,, lack of coordination R27.8,, fine motor skills loss R29.818,, muscle weakness generalized M62.81, or adherent scar L90.5,  Onset Date: 21                Next Dr. Corbett Freeze: 21  Visit# / total visits: ; Progress note for Medicare patient completed on visit 19   Cancels/No Shows: 0/0      Subjective:    Pain:  [x] Yes  [] No Location: dorsal side of R small digit MCP Pain Rating: (0-10 scale) 0/10 at rest  Pain altered Tx:  [x] No  [] Yes  Action: none   Pt Comments: \"I dont have any pain today\"    Objective:  Modalities:Modality Flow Sheet:  START STOP Tx Modality     Electrical Stim:     3-10-21  Ultrasound: _.8__ W/cm2 x __8_ mins  Duty factor: _x_100%  __50%  __33% __20%  Head size:    __2_ cm  MHz: __1mHz  _x_3mHz  Location: dorsal side of L little finger      Hot Pack:     Cold Pack:     Precautions: NA  Exercises:  EXERCISE    REPS/     TIME  WEIGHT/    LEVEL COMMENTS   AROM 10  Not completed   PROM MCP, PIP, DIP 8 mins  completed   Towel Crunches 10  completed   Active Blocked Digits 10  Not Completed   Pinch pins with foam blocks 1 series Green 4  pounds  Completed thumb, small and ring loose fist MET  3. Increase strength (pounds);  a. R  strength to 55 for increased I with home care tasks (open jars) MET  b. R Lateral pinch to 22 for increased I with heavy home tasks MET  c. R 2 point pinch to 15 for increased I with heavy home tasks MET  d. R 3 jaw pinch to 24 for increased I with heavy home tasks ONGOING  4. Increase function:UE Functional Index Score 54 or more points to promote increased functional abilities MET  5. Decrease Edema: of R small digit at P1 to 7.0 and P2 to 5.8 MET  6. Patient to be independent with home exercise program as demonstrated by performance with correct form without cues. MET     Long Term Goals: (  24  Treatments)  1. Scar will be soft and pliable with minimal tethering. 7. 2. Increase AROM (degrees) (extension/flexion)  a. R small digit MCP flexion to 75 for increased promotion for loose fist  b. R small digit PIP flexion to 65 for increased promotion for loose fist MET  c. R small DIP flexion to 50 for increased promotion of loose fist   8. Increase strength (pounds);  a. R  strength to 65 for increased I with home care tasks (open jars) MET  b. R Lateral pinch to 25 for increased I with heavy home tasks  c. R 2 point pinch to 17 for increased I with heavy home tasks              R 3 jaw pinch to 24 for increased I with   Pt. Education:  [] Yes  [x] No  [] Reviewed Prior HEP/Ed  Method of Education: [x] Verbal  [] Demo  [] Written  Re:  Comprehension of Education:  [] Verbalizes understanding. [x] Demonstrates understanding. [] Needs review. [] Demonstrates/verbalizes HEP/Ed previously given. Plan: [x] Continue current frequency toward short and long term goals.   [] Specific Instructions for subsequent treatments: upgraded resistance as needed with exercises   [] Other:     Time In: 9293-9072     Electronically signed by:  KARINA Serrano/TISHA

## 2021-05-07 ENCOUNTER — HOSPITAL ENCOUNTER (OUTPATIENT)
Dept: OCCUPATIONAL THERAPY | Age: 36
Setting detail: THERAPIES SERIES
Discharge: HOME | End: 2021-05-07
Payer: COMMERCIAL

## 2021-05-07 PROCEDURE — 97035 APP MDLTY 1+ULTRASOUND EA 15: CPT

## 2021-05-07 PROCEDURE — 97110 THERAPEUTIC EXERCISES: CPT
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
## 2021-05-10 ENCOUNTER — HOSPITAL ENCOUNTER (OUTPATIENT)
Dept: OCCUPATIONAL THERAPY | Age: 36
Setting detail: THERAPIES SERIES
Discharge: HOME | End: 2021-05-10
Payer: COMMERCIAL

## 2021-05-10 PROCEDURE — 97110 THERAPEUTIC EXERCISES: CPT
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
## 2021-05-11 ENCOUNTER — OFFICE VISIT (OUTPATIENT)
Dept: BURN CARE | Age: 36
End: 2021-05-11
Payer: COMMERCIAL

## 2021-05-11 VITALS
HEART RATE: 90 BPM | SYSTOLIC BLOOD PRESSURE: 150 MMHG | DIASTOLIC BLOOD PRESSURE: 100 MMHG | HEIGHT: 72 IN | WEIGHT: 296 LBS | BODY MASS INDEX: 40.09 KG/M2

## 2021-05-11 DIAGNOSIS — S62.616D CLOSED DISPLACED FRACTURE OF PROXIMAL PHALANX OF RIGHT LITTLE FINGER WITH ROUTINE HEALING, SUBSEQUENT ENCOUNTER: Primary | ICD-10-CM

## 2021-05-11 PROCEDURE — 99212 OFFICE O/P EST SF 10 MIN: CPT | Performed by: PLASTIC SURGERY

## 2021-05-11 RX ORDER — IBUPROFEN 200 MG
200 TABLET ORAL EVERY 6 HOURS PRN
COMMUNITY

## 2021-05-11 NOTE — PROGRESS NOTES
Hand Clinic Note    S: Pt is a 31 y.o. male being seen following closed reduction percutaneous pinning of his right middle finger. Patient has no complaints this time. He is working with occupational therapy 3 times a week, and reports continued improvements in range of motion. He still has some limitations in flexion of his little finger however. He does not complain of any pain. He states he feels he is ready to return to work. O:   Vitals:    05/11/21 1011   BP: (!) 150/100   Pulse: 90     Gen: NAD, cooperative    Right hand: Surgical sites healing well, without TTP. Limitations in flexion noted at the DIP, PIP and MCP. Patient is able to make near full fist. Compartments soft. 2+ rad pulse. Median/Radial/Ulnar/AIN/PIN motor intact. Median/Radial/Ulnar nerve SILT. A/P: 31 y.o. male status post closed reduction percutaneous pinning of the right little finger on 1/26/2021    - Patient may return to work without any restrictions. - Return to work will increase range of motion, and reduce the scar tissue.  - Keep area clean and dry  - Wash with gentle soap  - Tylenol/ibuprofen for pain control  - F/u 6 weeks or as needed    Jhon Backers, DO   Orthopedic Surgery Resident PGY-1  [address]      Attending Physician Statement  I have discussed the case, including pertinent history and exam findings with the resident. I have seen and examined the patient and the key elements of all parts of the encounter have been performed by me. I agree with the assessment, plan and orders as documented by the resident.   Tiffanie Guerin MD on5/11/2021 on 10:40 AM

## 2021-05-14 ENCOUNTER — APPOINTMENT (OUTPATIENT)
Dept: OCCUPATIONAL THERAPY | Age: 36
End: 2021-05-14
Payer: COMMERCIAL

## 2022-12-20 ENCOUNTER — HOSPITAL ENCOUNTER (OUTPATIENT)
Age: 37
Setting detail: SPECIMEN
Discharge: HOME | End: 2022-12-20

## 2022-12-21 LAB
ABSOLUTE EOS #: 0.19 K/UL (ref 0–0.44)
ABSOLUTE IMMATURE GRANULOCYTE: <0.03 K/UL (ref 0–0.3)
ABSOLUTE LYMPH #: 1.42 K/UL (ref 1.1–3.7)
ABSOLUTE MONO #: 0.43 K/UL (ref 0.1–1.2)
ALBUMIN SERPL-MCNC: 4.6 G/DL (ref 3.5–5.2)
ALBUMIN/GLOBULIN RATIO: 1.7 (ref 1–2.5)
ALP BLD-CCNC: 64 U/L (ref 40–129)
ALT SERPL-CCNC: 56 U/L (ref 5–41)
ANION GAP SERPL CALCULATED.3IONS-SCNC: 12 MMOL/L (ref 9–17)
AST SERPL-CCNC: 48 U/L
BASOPHILS # BLD: 1 % (ref 0–2)
BASOPHILS ABSOLUTE: 0.04 K/UL (ref 0–0.2)
BILIRUB SERPL-MCNC: 0.3 MG/DL (ref 0.3–1.2)
BUN BLDV-MCNC: 15 MG/DL (ref 6–20)
CALCIUM SERPL-MCNC: 9.3 MG/DL (ref 8.6–10.4)
CHLORIDE BLD-SCNC: 106 MMOL/L (ref 98–107)
CHOLESTEROL/HDL RATIO: 4.1
CHOLESTEROL: 209 MG/DL
CO2: 26 MMOL/L (ref 20–31)
CREAT SERPL-MCNC: 1 MG/DL (ref 0.7–1.2)
EOSINOPHILS RELATIVE PERCENT: 5 % (ref 1–4)
GFR SERPL CREATININE-BSD FRML MDRD: >60 ML/MIN/1.73M2
GLUCOSE BLD-MCNC: 106 MG/DL (ref 70–99)
HCT VFR BLD CALC: 46.1 % (ref 40.7–50.3)
HDLC SERPL-MCNC: 51 MG/DL
HEMOGLOBIN: 14.3 G/DL (ref 13–17)
IMMATURE GRANULOCYTES: 1 %
LDL CHOLESTEROL: 145 MG/DL (ref 0–130)
LYMPHOCYTES # BLD: 35 % (ref 24–43)
MCH RBC QN AUTO: 31.2 PG (ref 25.2–33.5)
MCHC RBC AUTO-ENTMCNC: 31 G/DL (ref 28.4–34.8)
MCV RBC AUTO: 100.4 FL (ref 82.6–102.9)
MONOCYTES # BLD: 11 % (ref 3–12)
NRBC AUTOMATED: 0 PER 100 WBC
PDW BLD-RTO: 12.8 % (ref 11.8–14.4)
PLATELET # BLD: 207 K/UL (ref 138–453)
PMV BLD AUTO: 11.7 FL (ref 8.1–13.5)
POTASSIUM SERPL-SCNC: 5.2 MMOL/L (ref 3.7–5.3)
RBC # BLD: 4.59 M/UL (ref 4.21–5.77)
SEG NEUTROPHILS: 47 % (ref 36–65)
SEGMENTED NEUTROPHILS ABSOLUTE COUNT: 2.01 K/UL (ref 1.5–8.1)
SODIUM BLD-SCNC: 144 MMOL/L (ref 135–144)
TOTAL PROTEIN: 7.3 G/DL (ref 6.4–8.3)
TRIGL SERPL-MCNC: 67 MG/DL
TSH SERPL DL<=0.05 MIU/L-ACNC: 0.75 UIU/ML (ref 0.3–5)
VITAMIN D 25-HYDROXY: 17.4 NG/ML
WBC # BLD: 4.1 K/UL (ref 3.5–11.3)

## 2024-01-18 NOTE — FLOWSHEET NOTE
[x] [facility]       Occupational Therapy            1st floor       [address], [place]         Phone: [phone]       Fax: [phone] [] [facility]  [address] [place] [address] [place]   [place], [place]  Phone: [phone]  Fax: [phone]      Occupational Therapy Daily Treatment Note    Date:  2021  Patient Name:  Jnaak Barreto    :  1985  MRN: [id number]  Patient: Janak Barreto             : 1985                      MRN: [id number]  Referring Provider:  Walter Medrano MD  Insurance: Other [employer] 180/180 life time amount   Medical Diagnosis: displaced fracture of proximal phalanx of R little finger, subsequent encounter for fracture with routine healing S62.616D            Rehab Codes: pain in hand M79.646,, stiffness in hand M25.64,, lack of coordination R27.8,, fine motor skills loss R29.818,, muscle weakness generalized M62.81, or adherent scar L90.5,  Onset Date: 21                Next Dr. Chow Shadow: 3-30-21  Visit# / total visits: ; Progress note for Medicare patient due at visit 9-10   Cancels/No Shows: 0/0      Subjective:    Pain:  [x] Yes  [] No Location: dorsal side of R small digit MCP Pain Rating: (0-10 scale) 2/10 at rest  Pain altered Tx:  [x] No  [] Yes  Action: none   Pt Comments: Pt with no new complaints upon arrival.     Objective:  Modalities:Modality Flow Sheet:  START STOP Tx Modality     Electrical Stim:     3-10-21  Ultrasound: _.8__ W/cm2 x __8_ mins  Duty factor: _x_100%  __50%  __33% __20%  Head size:    __2_ cm  MHz: __1mHz  _x_3mHz  Location: dorsal side of L little finger      Hot Pack:     Cold Pack:     Precautions: NA  Exercises:  EXERCISE    REPS/     TIME  WEIGHT/    LEVEL COMMENTS   AROM 10  Not completed   PROM MCP, PIP, DIP 8 mins  completed   Towel Crunches 10  completed   Active Blocked Digits 10  Not Completed   Pinch pins with foam blocks 1 series Green 4  pounds  Completed thumb, small and ring finger. Theraputty   Strength  Finger flexion  Finger extension  Radial Dev  Ulnar Dev  Palmar Pinch  Lateral Pinch  Thumb ext   Three jaw brad  Finger thumb ext     orange 10   completed   Finger platter for Active blocked digits 10 reps   Completed    Radial release marbles 1 series  completed   Flexbar   strength  Wrist Flexion/Ext  Wrist pronation  Wrist supination  Wrist ulnar deviation  Wrist radial dev  Shoulder Adb  Shoulder Add 10 reps   Green    Completed. Other: NA    Treatment Charges: Mins Units   []  Modalities:      [x]  Ultrasound 8 1   [x]  Ther Exercise 32 2   []  Manual Therapy     []  Ther Activities     []  Orthotic fit/train     []  Orthotic recheck     [x]  Other      Total Treatment time 40 3     Assessment: [x] Progressing toward goals. Min difficulty with completing pinch pin task using small and ring fingers and thumb. Several rest breaks needed. Tolerated all other exercises without complaints. Good tolerance of PROM to R 5th digit. [] No change. [] Other     [x] Patient would benefit from skilled occupational therapy services in order to: Improve  functional /grasp, Motor control/Tone in UE, ROM, Strength, Activity tolerance, Coordination deficit and Complaint of pain in order to Ensure safe return to work     Short Term Goals: (  12    Treatments)  1. Decrease Pain: to 4/10 with simple grooming tasks at home  2. Increase AROM (degrees) (extension/flexion)  a. R small digit MCP flexion to 30 for increased promotion for loose fist  b. R small digit PIP flexion to 30 for increased promotion for loose fist  c. R small DIP flexion to 25 for increased promotion of loose fist  3. Increase strength (pounds);  a. R  strength to 55 for increased I with home care tasks (open jars)  b. R Lateral pinch to 22 for increased I with heavy home tasks  c. R 2 point pinch to 15 for increased I with heavy home tasks  d.  R 3 jaw pinch to 24 for increased I with heavy home tasks  4. Increase function:UE Functional Index Score 54 or more points to promote increased functional abilities  5. Decrease Edema: of R small digit at P1 to 7.0 and P2 to 5.8 MET 4-26  6. Patient to be independent with home exercise program as demonstrated by performance with correct form without cues.     Long Term Goals: (  24  Treatments)  1. Scar will be soft and pliable with minimal tethering. 7. 2. Increase AROM (degrees) (extension/flexion)  a. R small digit MCP flexion to 75 for increased promotion for loose fist  b. R small digit PIP flexion to 65 for increased promotion for loose fist  c. R small DIP flexion to 50 for increased promotion of loose fist  8. Increase strength (pounds);  a. R  strength to 65 for increased I with home care tasks (open jars)  b. R Lateral pinch to 25 for increased I with heavy home tasks  c. R 2 point pinch to 17 for increased I with heavy home tasks              R 3 jaw pinch to 24 for increased I with heavy home tasks    Pt. Education:  [] Yes  [x] No  [] Reviewed Prior HEP/Ed  Method of Education: [] Verbal  [] Demo  [] Written  Re:  Comprehension of Education:  [] Verbalizes understanding. [x] Demonstrates understanding. [] Needs review. [] Demonstrates/verbalizes HEP/Ed previously given. Plan: [x] Continue current frequency toward short and long term goals.   [] Specific Instructions for subsequent treatments: upgrade resistance as needed with exercises   [] Other:     Time In: 3097-6976     Electronically signed by:  KARINA Blanco/TISHA Home

## (undated) DEVICE — GLOVE ORANGE PI 8 1/2   MSG9085

## (undated) DEVICE — SVMMC HND

## (undated) DEVICE — TOURNIQUET CUF BLD PRESSURE 4X18 IN 2 PRT SINGLE BLDR RED

## (undated) DEVICE — BANDAGE GZ W2XL75IN ST RAYON POLY CNFRM STRTCH LTWT

## (undated) DEVICE — BNDG,ELSTC,MATRIX,STRL,2"X5YD,LF,HOOK&LP: Brand: MEDLINE

## (undated) DEVICE — GLOVE ORANGE PI 7   MSG9070

## (undated) DEVICE — SPLINT ORTH FROG MED 2.5X3 IN FNGR LN FOAM ALUM BLU

## (undated) DEVICE — SOLUTION SCRB 4OZ 4% CHG H2O AIDED FOR PREOPERATIVE SKIN

## (undated) DEVICE — STERILE POLYISOPRENE POWDER-FREE SURGICAL GLOVES WITH EMOLLIENT COATING: Brand: PROTEXIS

## (undated) DEVICE — Z DISCONTINUED BY MEDLINE USE 2711682 TRAY SKIN PREP DRY W/ PREM GLV

## (undated) DEVICE — DRAPE,REIN 53X77,STERILE: Brand: MEDLINE

## (undated) DEVICE — INTENDED FOR TISSUE SEPARATION, AND OTHER PROCEDURES THAT REQUIRE A SHARP SURGICAL BLADE TO PUNCTURE OR CUT.: Brand: BARD-PARKER ® CARBON RIB-BACK BLADES

## (undated) DEVICE — DRESSING PETRO W3XL8IN OIL EMUL N ADH GZ KNIT IMPREG CELOS

## (undated) DEVICE — STERILE POLYISOPRENE POWDER-FREE SURGICAL GLOVES: Brand: PROTEXIS

## (undated) DEVICE — GLOVE SURG SZ 65 THK91MIL LTX FREE SYN POLYISOPRENE

## (undated) DEVICE — MARKER,SKIN,WI/RULER AND LABELS: Brand: MEDLINE

## (undated) DEVICE — GLOVE ORANGE PI 8   MSG9080

## (undated) DEVICE — GLOVE ORANGE PI 7 1/2   MSG9075

## (undated) DEVICE — NEEDLE HYPO 25GA L1.5IN BLU POLYPR HUB S STL REG BVL STR

## (undated) DEVICE — SUTURE VCRL + SZ 4-0 L18IN ABSRB UD P-3 3/8 CIR REV CUT NDL VCP494H

## (undated) DEVICE — SPONGE LAP W18XL18IN WHT COT 4 PLY FLD STRUNG RADPQ DISP ST

## (undated) DEVICE — GARMENT,MEDLINE,DVT,INT,CALF,MED, GEN2: Brand: MEDLINE